# Patient Record
Sex: FEMALE | Race: WHITE | NOT HISPANIC OR LATINO | Employment: FULL TIME | URBAN - METROPOLITAN AREA
[De-identification: names, ages, dates, MRNs, and addresses within clinical notes are randomized per-mention and may not be internally consistent; named-entity substitution may affect disease eponyms.]

---

## 2018-03-08 ENCOUNTER — HISTORICAL (OUTPATIENT)
Dept: RADIOLOGY | Facility: HOSPITAL | Age: 58
End: 2018-03-08

## 2018-08-06 ENCOUNTER — HISTORICAL (OUTPATIENT)
Dept: ADMINISTRATIVE | Facility: HOSPITAL | Age: 58
End: 2018-08-06

## 2018-08-06 LAB
ABS NEUT (OLG): 7.3
ALBUMIN SERPL-MCNC: 4.6 GM/DL (ref 3.4–5)
ALBUMIN/GLOB SERPL: 2 {RATIO} (ref 1.5–2.5)
ALP SERPL-CCNC: 114 UNIT/L (ref 38–126)
ALT SERPL-CCNC: 21 UNIT/L (ref 7–52)
APPEARANCE, UA: CLEAR
AST SERPL-CCNC: 21 UNIT/L (ref 15–37)
BACTERIA #/AREA URNS AUTO: ABNORMAL /HPF
BILIRUB SERPL-MCNC: 0.4 MG/DL (ref 0.2–1)
BILIRUB UR QL STRIP: NEGATIVE MG/DL
BILIRUBIN DIRECT+TOT PNL SERPL-MCNC: 0.1 MG/DL (ref 0–0.5)
BILIRUBIN DIRECT+TOT PNL SERPL-MCNC: 0.3 MG/DL
BUN SERPL-MCNC: 17 MG/DL (ref 7–18)
CALCIUM SERPL-MCNC: 9.1 MG/DL (ref 8.5–10)
CHLORIDE SERPL-SCNC: 109 MMOL/L (ref 98–107)
CHOLEST SERPL-MCNC: 193 MG/DL (ref 0–200)
CHOLEST/HDLC SERPL: 4.3 {RATIO}
CO2 SERPL-SCNC: 24 MMOL/L (ref 21–32)
COLOR UR: YELLOW
CREAT SERPL-MCNC: 0.85 MG/DL (ref 0.6–1.3)
ERYTHROCYTE [DISTWIDTH] IN BLOOD BY AUTOMATED COUNT: 13.8 % (ref 11.5–17)
EST. AVERAGE GLUCOSE BLD GHB EST-MCNC: 111 MG/DL
GLOBULIN SER-MCNC: 2.3 GM/DL (ref 1.2–3)
GLUCOSE (UA): NEGATIVE MG/DL
GLUCOSE SERPL-MCNC: 116 MG/DL (ref 74–106)
HBA1C MFR BLD: 5.5 % (ref 4.4–6.4)
HCT VFR BLD AUTO: 45.2 % (ref 37–47)
HDLC SERPL-MCNC: 45 MG/DL (ref 35–60)
HGB BLD-MCNC: 15.1 GM/DL (ref 12–16)
HGB UR QL STRIP: NEGATIVE UNIT/L
KETONES UR QL STRIP: NEGATIVE MG/DL
LDLC SERPL CALC-MCNC: 114 MG/DL (ref 0–129)
LEUKOCYTE ESTERASE UR QL STRIP: NEGATIVE UNIT/L
LYMPHOCYTES # BLD AUTO: 1.1 X10(3)/MCL (ref 0.6–3.4)
LYMPHOCYTES NFR BLD AUTO: 12.3 % (ref 13–40)
MCH RBC QN AUTO: 30.6 PG (ref 27–31.2)
MCHC RBC AUTO-ENTMCNC: 33 GM/DL (ref 32–36)
MCV RBC AUTO: 92 FL (ref 80–94)
MONOCYTES # BLD AUTO: 0.6 X10(3)/MCL (ref 0–1.8)
MONOCYTES NFR BLD AUTO: 7.2 % (ref 0.1–24)
NEUTROPHILS NFR BLD AUTO: 80.5 % (ref 47–80)
NITRITE UR QL STRIP.AUTO: NEGATIVE
PH UR STRIP: 5 [PH]
PLATELET # BLD AUTO: 220 X10(3)/MCL (ref 130–400)
PMV BLD AUTO: 10.3 FL
POTASSIUM SERPL-SCNC: 4.9 MMOL/L (ref 3.5–5.1)
PROT SERPL-MCNC: 6.9 GM/DL (ref 6.4–8.2)
PROT UR QL STRIP: NEGATIVE MG/DL
RBC # BLD AUTO: 4.93 X10(6)/MCL (ref 4.2–5.4)
RBC #/AREA URNS HPF: ABNORMAL /HPF
SODIUM SERPL-SCNC: 144 MMOL/L (ref 136–145)
SP GR UR STRIP: >1.03
SQUAMOUS EPITHELIAL, UA: ABNORMAL /LPF
TRIGL SERPL-MCNC: 182 MG/DL (ref 30–150)
TSH SERPL-ACNC: 1 MIU/ML (ref 0.35–4.94)
UROBILINOGEN UR STRIP-ACNC: 0.2 MG/DL
VLDLC SERPL CALC-MCNC: 36.4 MG/DL
WBC # SPEC AUTO: 9 X10(3)/MCL (ref 4.5–11.5)
WBC #/AREA URNS AUTO: ABNORMAL /[HPF]

## 2018-08-27 ENCOUNTER — HISTORICAL (OUTPATIENT)
Dept: ADMINISTRATIVE | Facility: HOSPITAL | Age: 58
End: 2018-08-27

## 2019-06-12 ENCOUNTER — HISTORICAL (OUTPATIENT)
Dept: ADMINISTRATIVE | Facility: HOSPITAL | Age: 59
End: 2019-06-12

## 2019-06-17 ENCOUNTER — HISTORICAL (OUTPATIENT)
Dept: ADMINISTRATIVE | Facility: HOSPITAL | Age: 59
End: 2019-06-17

## 2019-07-10 ENCOUNTER — HISTORICAL (OUTPATIENT)
Dept: ADMINISTRATIVE | Facility: HOSPITAL | Age: 59
End: 2019-07-10

## 2019-08-21 ENCOUNTER — HISTORICAL (OUTPATIENT)
Dept: ADMINISTRATIVE | Facility: HOSPITAL | Age: 59
End: 2019-08-21

## 2019-08-29 ENCOUNTER — HISTORICAL (OUTPATIENT)
Dept: ADMINISTRATIVE | Facility: HOSPITAL | Age: 59
End: 2019-08-29

## 2019-08-29 LAB
APPEARANCE, UA: CLEAR
BACTERIA #/AREA URNS AUTO: NORMAL /HPF
BILIRUB UR QL STRIP: NEGATIVE MG/DL
CHOLEST SERPL-MCNC: 152 MG/DL (ref 0–200)
CHOLEST/HDLC SERPL: 4.1 {RATIO}
COLOR UR: YELLOW
GLUCOSE (UA): NEGATIVE MG/DL
HDLC SERPL-MCNC: 37 MG/DL (ref 35–60)
HGB UR QL STRIP: NEGATIVE UNIT/L
KETONES UR QL STRIP: NEGATIVE MG/DL
LDLC SERPL CALC-MCNC: 86 MG/DL (ref 0–129)
LEUKOCYTE ESTERASE UR QL STRIP: NEGATIVE UNIT/L
NITRITE UR QL STRIP.AUTO: NEGATIVE
PH UR STRIP: 5.5 [PH]
PROT UR QL STRIP: NEGATIVE MG/DL
RBC #/AREA URNS HPF: NORMAL /HPF
SP GR UR STRIP: 1.02
SQUAMOUS EPITHELIAL, UA: NORMAL /LPF
TRIGL SERPL-MCNC: 111 MG/DL (ref 30–150)
UROBILINOGEN UR STRIP-ACNC: 0.2 MG/DL
VLDLC SERPL CALC-MCNC: 22.2 MG/DL
WBC #/AREA URNS AUTO: NORMAL /[HPF]

## 2019-11-20 ENCOUNTER — HISTORICAL (OUTPATIENT)
Dept: ADMINISTRATIVE | Facility: HOSPITAL | Age: 59
End: 2019-11-20

## 2019-12-05 ENCOUNTER — HISTORICAL (OUTPATIENT)
Dept: ADMINISTRATIVE | Facility: HOSPITAL | Age: 59
End: 2019-12-05

## 2019-12-05 LAB
APPEARANCE, UA: CLEAR
BACTERIA #/AREA URNS AUTO: ABNORMAL /HPF
BILIRUB UR QL STRIP: NEGATIVE MG/DL
COLOR UR: YELLOW
GLUCOSE (UA): NEGATIVE MG/DL
HGB UR QL STRIP: NEGATIVE UNIT/L
KETONES UR QL STRIP: NEGATIVE MG/DL
LEUKOCYTE ESTERASE UR QL STRIP: NEGATIVE UNIT/L
NITRITE UR QL STRIP.AUTO: NEGATIVE
PH UR STRIP: 5.5 [PH]
PROT UR QL STRIP: NEGATIVE MG/DL
RBC #/AREA URNS HPF: ABNORMAL /HPF
SP GR UR STRIP: >1.03
SQUAMOUS EPITHELIAL, UA: ABNORMAL /LPF
UROBILINOGEN UR STRIP-ACNC: 0.2 MG/DL
WBC #/AREA URNS AUTO: ABNORMAL /[HPF]

## 2020-02-04 ENCOUNTER — HISTORICAL (OUTPATIENT)
Dept: ADMINISTRATIVE | Facility: HOSPITAL | Age: 60
End: 2020-02-04

## 2020-05-13 ENCOUNTER — HISTORICAL (OUTPATIENT)
Dept: ADMINISTRATIVE | Facility: HOSPITAL | Age: 60
End: 2020-05-13

## 2020-06-11 ENCOUNTER — HISTORICAL (OUTPATIENT)
Dept: ADMINISTRATIVE | Facility: HOSPITAL | Age: 60
End: 2020-06-11

## 2020-06-11 LAB
APPEARANCE, UA: CLEAR
BACTERIA #/AREA URNS AUTO: ABNORMAL /HPF
BILIRUB UR QL STRIP: NEGATIVE MG/DL
COLOR UR: YELLOW
GLUCOSE (UA): NEGATIVE MG/DL
HGB UR QL STRIP: NEGATIVE UNIT/L
KETONES UR QL STRIP: ABNORMAL MG/DL
LEUKOCYTE ESTERASE UR QL STRIP: NEGATIVE UNIT/L
NITRITE UR QL STRIP.AUTO: NEGATIVE
PH UR STRIP: 6 [PH]
PROT UR QL STRIP: ABNORMAL MG/DL
RBC #/AREA URNS HPF: ABNORMAL /HPF
SP GR UR STRIP: >1.03
SQUAMOUS EPITHELIAL, UA: ABNORMAL /LPF
UROBILINOGEN UR STRIP-ACNC: 0.2 MG/DL
WBC #/AREA URNS AUTO: ABNORMAL /[HPF]

## 2020-07-08 LAB
BUN SERPL-MCNC: 11.7 MG/DL (ref 9.8–20.1)
CALCIUM SERPL-MCNC: 11 MG/DL (ref 8.4–10.2)
CHLORIDE SERPL-SCNC: 107 MMOL/L (ref 98–107)
CO2 SERPL-SCNC: 28 MMOL/L (ref 22–29)
CREAT SERPL-MCNC: 1.03 MG/DL (ref 0.57–1.11)
CREAT/UREA NIT SERPL: 11
GLUCOSE SERPL-MCNC: 117 MG/DL (ref 74–100)
POTASSIUM SERPL-SCNC: 4.2 MMOL/L (ref 3.5–5.1)
SODIUM SERPL-SCNC: 147 MMOL/L (ref 136–145)

## 2020-07-16 ENCOUNTER — HISTORICAL (OUTPATIENT)
Dept: SURGERY | Facility: HOSPITAL | Age: 60
End: 2020-07-16

## 2020-08-19 ENCOUNTER — HISTORICAL (OUTPATIENT)
Dept: ADMINISTRATIVE | Facility: HOSPITAL | Age: 60
End: 2020-08-19

## 2020-08-19 LAB
ABS NEUT (OLG): 6.8 X10(3)/MCL (ref 2.1–9.2)
ALBUMIN SERPL-MCNC: 4.8 GM/DL (ref 3.4–5)
ALBUMIN/GLOB SERPL: 2.18 {RATIO} (ref 1.5–2.5)
ALP SERPL-CCNC: 111 UNIT/L (ref 38–126)
ALT SERPL-CCNC: 16 UNIT/L (ref 7–52)
APPEARANCE, UA: CLEAR
AST SERPL-CCNC: 16 UNIT/L (ref 15–37)
BACTERIA #/AREA URNS AUTO: ABNORMAL /HPF
BILIRUB SERPL-MCNC: 0.4 MG/DL (ref 0.2–1)
BILIRUB UR QL STRIP: NEGATIVE MG/DL
BILIRUBIN DIRECT+TOT PNL SERPL-MCNC: 0.1 MG/DL (ref 0–0.5)
BILIRUBIN DIRECT+TOT PNL SERPL-MCNC: 0.3 MG/DL
BUN SERPL-MCNC: 16 MG/DL (ref 7–18)
CALCIUM SERPL-MCNC: 10.1 MG/DL (ref 8.5–10)
CHLORIDE SERPL-SCNC: 105 MMOL/L (ref 98–107)
CHOLEST SERPL-MCNC: 221 MG/DL (ref 0–200)
CHOLEST/HDLC SERPL: 4.6 {RATIO}
CO2 SERPL-SCNC: 27 MMOL/L (ref 21–32)
COLOR UR: YELLOW
CREAT SERPL-MCNC: 0.78 MG/DL (ref 0.6–1.3)
ERYTHROCYTE [DISTWIDTH] IN BLOOD BY AUTOMATED COUNT: 13.7 % (ref 11.5–17)
EST. AVERAGE GLUCOSE BLD GHB EST-MCNC: 103 MG/DL
GLOBULIN SER-MCNC: 2.2 GM/DL (ref 1.2–3)
GLUCOSE (UA): NEGATIVE MG/DL
GLUCOSE SERPL-MCNC: 109 MG/DL (ref 74–106)
HBA1C MFR BLD: 5.2 % (ref 4.4–6.4)
HCT VFR BLD AUTO: 45.4 % (ref 37–47)
HDLC SERPL-MCNC: 48 MG/DL (ref 35–60)
HGB BLD-MCNC: 15.1 GM/DL (ref 12–16)
HGB UR QL STRIP: NEGATIVE UNIT/L
KETONES UR QL STRIP: NEGATIVE MG/DL
LDLC SERPL CALC-MCNC: 146 MG/DL (ref 0–129)
LEUKOCYTE ESTERASE UR QL STRIP: NEGATIVE UNIT/L
LYMPHOCYTES # BLD AUTO: 1.2 X10(3)/MCL (ref 0.6–3.4)
LYMPHOCYTES NFR BLD AUTO: 13.9 % (ref 13–40)
MCH RBC QN AUTO: 30.6 PG (ref 27–31.2)
MCHC RBC AUTO-ENTMCNC: 33 GM/DL (ref 32–36)
MCV RBC AUTO: 92 FL (ref 80–94)
MONOCYTES # BLD AUTO: 0.7 X10(3)/MCL (ref 0.1–1.3)
MONOCYTES NFR BLD AUTO: 7.7 % (ref 0.1–24)
NEUTROPHILS NFR BLD AUTO: 78.4 % (ref 47–80)
NITRITE UR QL STRIP.AUTO: NEGATIVE
PH UR STRIP: 5.5 [PH]
PLATELET # BLD AUTO: 252 X10(3)/MCL (ref 130–400)
PMV BLD AUTO: 9.5 FL (ref 9.4–12.4)
POTASSIUM SERPL-SCNC: 4.6 MMOL/L (ref 3.5–5.1)
PROT SERPL-MCNC: 7 GM/DL (ref 6.4–8.2)
PROT UR QL STRIP: NEGATIVE MG/DL
RBC # BLD AUTO: 4.94 X10(6)/MCL (ref 4.2–5.4)
RBC #/AREA URNS HPF: ABNORMAL /HPF
SODIUM SERPL-SCNC: 141 MMOL/L (ref 136–145)
SP GR UR STRIP: >1.03
SQUAMOUS EPITHELIAL, UA: ABNORMAL /LPF
TRIGL SERPL-MCNC: 205 MG/DL (ref 30–150)
TSH SERPL-ACNC: 0.61 MIU/ML (ref 0.35–4.94)
UROBILINOGEN UR STRIP-ACNC: 1 MG/DL
VLDLC SERPL CALC-MCNC: 41 MG/DL
WBC # SPEC AUTO: 8.7 X10(3)/MCL (ref 4.5–11.5)
WBC #/AREA URNS AUTO: ABNORMAL /[HPF]

## 2021-07-24 ENCOUNTER — HISTORICAL (OUTPATIENT)
Dept: ADMINISTRATIVE | Facility: HOSPITAL | Age: 61
End: 2021-07-24

## 2021-07-24 LAB — SARS-COV-2 RNA RESP QL NAA+PROBE: POSITIVE

## 2021-07-30 ENCOUNTER — HISTORICAL (OUTPATIENT)
Dept: INFECTIOUS DISEASES | Facility: HOSPITAL | Age: 61
End: 2021-07-30

## 2021-08-24 ENCOUNTER — HISTORICAL (OUTPATIENT)
Dept: ADMINISTRATIVE | Facility: HOSPITAL | Age: 61
End: 2021-08-24

## 2021-08-24 LAB
ABS NEUT (OLG): 5.6 X10(3)/MCL (ref 2.1–9.2)
ALBUMIN SERPL-MCNC: 4.5 GM/DL (ref 3.4–5)
ALBUMIN/GLOB SERPL: 1.55 {RATIO} (ref 1.5–2.5)
ALP SERPL-CCNC: 103 UNIT/L (ref 38–126)
ALT SERPL-CCNC: 22 UNIT/L (ref 7–52)
APPEARANCE, UA: CLEAR
AST SERPL-CCNC: 23 UNIT/L (ref 15–37)
BACTERIA #/AREA URNS AUTO: ABNORMAL /HPF
BILIRUB SERPL-MCNC: 0.5 MG/DL (ref 0.2–1)
BILIRUB UR QL STRIP: NEGATIVE MG/DL
BILIRUBIN DIRECT+TOT PNL SERPL-MCNC: 0 MG/DL (ref 0–0.5)
BILIRUBIN DIRECT+TOT PNL SERPL-MCNC: 0.5 MG/DL
BUN SERPL-MCNC: 16 MG/DL (ref 7–18)
CALCIUM SERPL-MCNC: 9.9 MG/DL (ref 8.5–10)
CHLORIDE SERPL-SCNC: 106 MMOL/L (ref 98–107)
CHOLEST SERPL-MCNC: 145 MG/DL (ref 0–200)
CHOLEST/HDLC SERPL: 4 {RATIO}
CO2 SERPL-SCNC: 25 MMOL/L (ref 21–32)
COLOR UR: YELLOW
CREAT SERPL-MCNC: 0.79 MG/DL (ref 0.6–1.3)
ERYTHROCYTE [DISTWIDTH] IN BLOOD BY AUTOMATED COUNT: 14.8 % (ref 11.5–17)
EST. AVERAGE GLUCOSE BLD GHB EST-MCNC: 114 MG/DL
GLOBULIN SER-MCNC: 2.9 GM/DL (ref 1.2–3)
GLUCOSE (UA): NEGATIVE MG/DL
GLUCOSE SERPL-MCNC: 85 MG/DL (ref 74–106)
HBA1C MFR BLD: 5.6 % (ref 4.4–6.4)
HCT VFR BLD AUTO: 42 % (ref 37–47)
HDLC SERPL-MCNC: 36 MG/DL (ref 35–60)
HGB BLD-MCNC: 13.7 GM/DL (ref 12–16)
HGB UR QL STRIP: NEGATIVE UNIT/L
KETONES UR QL STRIP: ABNORMAL MG/DL
LDLC SERPL CALC-MCNC: 73 MG/DL (ref 0–129)
LEUKOCYTE ESTERASE UR QL STRIP: NEGATIVE UNIT/L
LYMPHOCYTES # BLD AUTO: 1.7 X10(3)/MCL (ref 0.6–3.4)
LYMPHOCYTES NFR BLD AUTO: 21.2 % (ref 13–40)
MCH RBC QN AUTO: 29.3 PG (ref 27–31.2)
MCHC RBC AUTO-ENTMCNC: 33 GM/DL (ref 32–36)
MCV RBC AUTO: 90 FL (ref 80–94)
MONOCYTES # BLD AUTO: 0.5 X10(3)/MCL (ref 0.1–1.3)
MONOCYTES NFR BLD AUTO: 7 % (ref 0.1–24)
NEUTROPHILS NFR BLD AUTO: 71.8 % (ref 47–80)
NITRITE UR QL STRIP.AUTO: NEGATIVE
PH UR STRIP: 5.5 [PH]
PLATELET # BLD AUTO: 329 X10(3)/MCL (ref 130–400)
PMV BLD AUTO: 9.3 FL (ref 9.4–12.4)
POTASSIUM SERPL-SCNC: 4 MMOL/L (ref 3.5–5.1)
PROT SERPL-MCNC: 7.4 GM/DL (ref 6.4–8.2)
PROT UR QL STRIP: NEGATIVE MG/DL
RBC # BLD AUTO: 4.68 X10(6)/MCL (ref 4.2–5.4)
RBC #/AREA URNS HPF: ABNORMAL /HPF
SODIUM SERPL-SCNC: 141 MMOL/L (ref 136–145)
SP GR UR STRIP: >1.03
SQUAMOUS EPITHELIAL, UA: ABNORMAL /LPF
TRIGL SERPL-MCNC: 144 MG/DL (ref 30–150)
UROBILINOGEN UR STRIP-ACNC: 0.2 MG/DL
VLDLC SERPL CALC-MCNC: 28.8 MG/DL
WBC # SPEC AUTO: 7.8 X10(3)/MCL (ref 4.5–11.5)
WBC #/AREA URNS AUTO: ABNORMAL /[HPF]

## 2022-01-14 ENCOUNTER — HISTORICAL (OUTPATIENT)
Dept: ADMINISTRATIVE | Facility: HOSPITAL | Age: 62
End: 2022-01-14

## 2022-01-14 LAB — SARS-COV-2 RNA RESP QL NAA+PROBE: NEGATIVE

## 2022-01-17 LAB
INFLUENZA A ANTIGEN, POC: NEGATIVE
INFLUENZA B ANTIGEN, POC: NEGATIVE
SARS-COV-2 RNA RESP QL NAA+PROBE: NEGATIVE

## 2022-04-10 ENCOUNTER — HISTORICAL (OUTPATIENT)
Dept: ADMINISTRATIVE | Facility: HOSPITAL | Age: 62
End: 2022-04-10
Payer: COMMERCIAL

## 2022-04-20 LAB — HPV APTIMA: NEGATIVE

## 2022-04-21 LAB
PAP RECOMMENDATION EXT: NORMAL
PAP SMEAR: NORMAL

## 2022-04-25 VITALS
DIASTOLIC BLOOD PRESSURE: 92 MMHG | WEIGHT: 174.19 LBS | SYSTOLIC BLOOD PRESSURE: 140 MMHG | BODY MASS INDEX: 29.02 KG/M2 | HEIGHT: 65 IN | OXYGEN SATURATION: 98 %

## 2022-04-30 NOTE — OP NOTE
Patient:   Shawna Nguyen            MRN: [id number]            FIN: [id number]               Age:   60 years     Sex:  Female     :  1960   Associated Diagnoses:   None   Author:   Gary ELLIS MD, Adeel CHANEY      SURGEON: Adeel Vega MD    PREOPERATIVE DIAGNOSIS: Right shoulder impingement, right shoulder adhesive capsulitis, retained hardware right proximal humerus  POSTOPERATIVE DIAGNOSIS: Right shoulder impingement, right shoulder adhesive capsulitis, retained hardware right proximal humerus    PROCEDURE PERFORMED:   1. Right shoulder arthroscopic lysis of adhesions, manipulation under anesthesia  2. Right shoulder arthroscopic subacromial decompression  3. Right shoulder open deep hardware removal    ASSISTANT: JOANNE Rodriguez    ANESTHESIA: General plus regional    ESTIMATED BLOOD LOSS: Minimal.    COMPLICATIONS: None.    EXPLANTS:    1.  Biomet proximal humerus plate and screws    INDICATIONS FOR PROCEDURE: Shawna is a 60y.o. female who has had ongoing right shoulder pain and limited motion status post open reduction internal fixation of a right proximal humerus fracture approximately 1 year ago. The patient has failed nonoperative treatment and has elected for operative intervention. Risks and benefits were thoroughly explained and consent was obtained prior to today's procedure.    DESCRIPTION OF PROCEDURE: The patient was seen in the preoperative holding area where the history and physical were reviewed without change. The operative shoulder was marked, consents were reviewed, and any questions were answered for the patient. A regional blockade of the shoulder was performed by the Anesthesia Service. The patient was induced under general anesthesia. Next the patient was placed upright into the beachchair position with care taken to ensure the cervical spine was well positioned and the face, eyes and all bony prominences well protected. Preoperative time-out led by the surgeon was performed  verifying the patient, procedure, and preoperative antibiotics. The right upper extremity was then prepped and draped in the usual sterile fashion and secured to an arm enriquez.    A standard posterior portal was established with a #11-blade.  The arthroscope was inserted into the glenohumeral joint atraumatically. The joint was insufflated with arthroscopic fluid. We then made a standard anterior portal using an #18-gauge spinal needle for guidance. An arthroscopic probe was inserted through the anterior portal and diagnostic arthroscopy begun.    This revealed a previous tenotomized biceps tendon with a remaining stump.  The stump was debrided with a shaver. A frayed superior labrum which was also debrided with a shaver. An intact anterior, inferior and posterior labrum. The articular cartilage of the humeral head was intact. The articular cartilage of the glenoid was intact. The subscapularis tendon was intact but encased in scar.  This was debrided with accommodation of a shaver and RF device.  [facility] was released.. The articular side of the supraspinatus tendon was intact. The articular side of the infraspinatus tendon was intact.    I then moved into the subacromial space.  We identified the undersurface of the acromion. We used a VAPR to debride the undersurface of the acromion up to the anterior and lateral margins. We identified the CA ligament and reflected it off the acromion. We continued debridement of the bursal tissue, which was extensive.  I used a shaver to the breed this.  I could actually visualize the anterior plate.    Next, we did our acromioplasty by burring the anterolateral margin of the acromion then working carefully medially. The portals were switched and the acromioplasty was complated through the posterior portal in the cutting block fashion.     Once we completed this, we took the remaining final arthroscopic pictures. We then removed our instruments, closed the portals with #3-0  monocryl suture.     I then turned my attention to the anterior shoulder.  I made the previous incision through the deltopectoral interval.  Sharp incised the skin and down through subcutaneous tissue.  Identified the cephalic vein and retracted laterally.  I came down onto the anterior plate and removed it in its entirety.  It was 1 plate and 4 screws.  I then turned my attention laterally and again removed the plate and screws on the lateral proximal humerus.  I then used a rongeur and a curette in order to remove some scar tissue which had developed.  There was a couple of retained permanent sutures which were also removed.  Happy this I irrigated out the wound and close incision.    Sterile dressings were applied. The patient was then placed in an abduction pillow and sling, awoken from general anesthetic, and taken to recovery room in a stable condition.

## 2022-05-03 NOTE — HISTORICAL [facility]
This is a historical note converted from [employer]. Formatting and pictures may have been removed.  Please reference [employer] for original formatting and attached multimedia. Chief Complaint  Hematuria  History of Present Illness  When she urinated last night, it was light pink. Her urine got lighter as the night progressed. Her urine is clear today.  She noticed minimal itching around her urethra 1 day ago; it has resolved. She denies dysuria or problems voiding. She may be going slightly more often but she is drinking more. No h/o kidney disease since she had glomerulonephritis. She did eat some beets yesterday evening. She is not on any blood thinners. She does not have any bleeding disorders. No history of kidney stones. No nausea/vomiting. No fever/chills.  Review of Systems  She is still having low back discomfort.  She is having surgery on 7/16/2020 to remove screws from her right shoulder.  Physical Exam  Vitals & Measurements  T:?36.7? ?C (Oral)? HR:?84(Peripheral)? BP:?100/60?  HT:?168?cm? WT:?79.9?kg? BMI:?28.31?  General: She is well-developed well-nourished white female no apparent distress.  She does not have any costovertebral angle tenderness or suprapubic tenderness. ?Abdomen?is soft and benign.  Bilateral lower extremities are without edema.  Assessment/Plan  1.?Hematuria?R31.9  Patient has a normal urinalysis.? She has no signs or symptoms?of?renal disease, kidney stones?or urinary tract infection.? Her previous urinalysis did not reveal any?hematuria.  Patient advised to let me know if she?takes any?more blood in her urine. ?I told her no further work-up is indicated at this time.  Patient is agreeable to plan and will let me know.  Ordered:  Office/Outpatient Visit Level 3 Established [id number] PC, Hematuria, [facility], 06/11/20 14:17:00 CDT  ?  Orders:  Clinic Follow-up PRN, 06/11/20 14:18:00 CDT, [facility], Future Order  Referrals  Clinic Follow-up PRN, 06/11/20 14:18:00 CDT, [facility]  [facility], Future Order   Problem List/Past Medical History  Ongoing  Anxiety  Constipation  Diverticulitis  Environmental allergies  Fracture of base of metacarpal of thumb  Fracture of great toe  Hemorrhoid  High blood cholesterol level  HTN (hypertension)  Incomplete defecation  Inflammation of right sacroiliac joint  Insomnia  Obesity  Polyp colon  Proximal humerus fracture  Right ankle sprain  Sleep apnea  Upper respiratory infection  Historical  No qualifying data  Procedure/Surgical History  ORIF Humerus Proximal (Right) (05/09/2019)  Pinning Percutaneous Finger (Left) (05/09/2019)  Reposition Left Metacarpal with Internal Fixation Device, Percutaneous Approach (05/09/2019)  Reposition Right Humeral Head with Internal Fixation Device, Open Approach (05/09/2019)  Reposition Right Toe Phalanx with Internal Fixation Device, Percutaneous Approach (05/09/2019)  Application of short arm splint (forearm to hand); static (05/05/2019)  Immobilization of Left Lower Arm using Splint (05/05/2019)  Cervical spinal fusion  Hemorrhoidectomy   Medications  alPRAzolam 0.5 mg oral tablet, 0.5 mg= 1 tab(s), Oral, TID, PRN  atorvastatin 40 mg oral tablet, See Instructions, 11 refills  lisinopril 10 mg oral tablet, See Instructions, 11 refills  polyethylene glycol 3350 ( MiraLax ), 17 gm, Oral, Daily  Allergies  codeine?(Itching, Hives)  Social History  Abuse/Neglect  No, 06/11/2020  Alcohol  Current, Beer, 1-2 times per year, 05/07/2019  Current, 1-2 times per week, 08/06/2018  Employment/School  Employed, Work/School description: OPERATIONS ., 08/06/2018  Exercise  Home/Environment  Lives with Alone., 05/07/2019  Living situation: Home/Independent. Home equipment: CPAP/BiPAP., 08/06/2018  Nutrition/Health  Regular, Diet restrictions: no seeds/nuts., 05/07/2019  Regular, Caffeine intake amount: 1-2 CUPS OF COFFEE PER DAY., 08/06/2018  Substance Use  Never, 08/06/2018  Tobacco - Denies Tobacco Use,  12/20/2013  Never (less than 100 in lifetime), N/A, 06/11/2020  Family History  Hyperlipidemia.: Brother.  Hypertension.: Sister and Brother.  Primary malignant neoplasm of brain: Brother.  Primary malignant neoplasm of lung: Brother.  Immunizations  Vaccine Date Status   tetanus/diphtheria/pertussis, acel(Tdap) 08/06/2018 Given   pneumococcal 13-valent conjugate vaccine 12/14/2016 Recorded   tetanus-diphtheria toxoids 06/22/2015 Given   tetanus/diphtheria/pertussis, acel(Tdap) 11/01/2007 Recorded   Health Maintenance  Health Maintenance  ???Pending?(in the next year)  ??? ??OverDue  ??? ? ? ?Diabetes Screening due??and every?  ??? ? ? ?Hypertension Management-BMP due??05/10/20??and every 1??year(s)  ??? ??Due?  ??? ? ? ?Aspirin Therapy for CVD Prevention due??06/11/20??and every 1??year(s)  ??? ? ? ?Depression Screening due??06/11/20??and every?  ??? ??Due In Future?  ??? ? ? ?Alcohol Misuse Screening not due until??01/01/21??and every 1??year(s)  ??? ? ? ?Obesity Screening not due until??01/01/21??and every 1??year(s)  ??? ? ? ?Cervical Cancer Screening not due until??02/21/21??and every 3??year(s)  ??? ? ? ?Breast Cancer Screening not due until??03/05/21??and every 2??year(s)  ??? ? ? ?ADL Screening not due until??05/13/21??and every 1??year(s)  ???Satisfied?(in the past 1 year)  ??? ??Satisfied?  ??? ? ? ?ADL Screening on??05/13/20.??Satisfied by Shantell Sloan  ??? ? ? ?Alcohol Misuse Screening on??06/08/20.??Satisfied by Amber Hollis  ??? ? ? ?Blood Pressure Screening on??06/11/20.??Satisfied by Sonya Srinivasan LPN  ??? ? ? ?Body Mass Index Check on??06/11/20.??Satisfied by Sonya Srinivasan LPN  ??? ? ? ?Hypertension Management-Blood Pressure on??06/11/20.??Satisfied by Sonya Srinivasan LPN  ??? ? ? ?Lipid Screening on??08/29/19.??Satisfied by Zander Engle  ??? ? ? ?Obesity Screening on??06/11/20.??Satisfied by Sonya Srinivasan LPN  ?  Lab Results  Test Name Test Result Date/Time   UA Appear Clear  06/11/2020 13:51 CDT   UA Color Yellow 06/11/2020 13:51 CDT   UA Spec Grav >1.030 (Abnormal) 06/11/2020 13:51 CDT   UA Bili Negative UA 06/11/2020 13:51 CDT   UA pH 6.0 06/11/2020 13:51 CDT   UA Urobilinogen 0.2 06/11/2020 13:51 CDT   UA Blood Negative UA 06/11/2020 13:51 CDT   UA Glucose Negative UA 06/11/2020 13:51 CDT   UA Ketones 1+ (Abnormal) 06/11/2020 13:51 CDT   UA Protein Trace UA 06/11/2020 13:51 CDT   UA Nitri Negative 06/11/2020 13:51 CDT   UA Leuk Est Negative UA 06/11/2020 13:51 CDT   UA WBC cnt 0-2 06/11/2020 13:51 CDT   UA RBC 0-3 06/11/2020 13:51 CDT   UA Bact Rare 06/11/2020 13:51 CDT   UA Squam Epithelial Few 06/11/2020 13:51 CDT

## 2022-05-03 NOTE — HISTORICAL [facility] CERNER
This is a historical note converted from Vonnie. Formatting and pictures may have been removed.  Please reference Vonnie for original formatting and attached multimedia. Chief Complaint  Annual wellness physical- NPO  History of Present Illness  Pt presents for Wellness exam.  Pt had COVID infection/antibody infusion in July. She was out for 21 days. She returned to work on 8/17/2021.  She is now working at [employer]; .  She does exercise bands.  She is not sleeping well; she has problems falling and staying asleep. She has been off of C-PAP for a few years.  Appetite varies. She eats an average of twice a day.  No tobacco.  Social alcohol; quite infrequent.  + coffee:?+ cup/day.  Colonoscopy 2017: Dr Jose Deluca; multiple polyps, mild diverticulosis.  GYN: Dr Cheryl Montanez, earlier this year. ?Mammogram: 2021.  Dermatologist: [facility]; Dr Papa Delatorre.  Review of Systems  Constitutional:?no?weight gain,?no?weight loss,?+?fatigue, ?no?fever, ?no?chills, ?no?weakness, ?+?trouble sleeping,?+?JORDAN, ?+ hot flashes  Eyes: ?no?vision loss/changes,?+?glasses?and contacts,??no?pain,??no?redness,??no?blurry or double vision,??no?flashing lights,??no?glaucoma,??no?cataracts  Last eye exam:? 2020  Neck: ?no?lymphadenopathy,??no?thyroid abnormalities,??no?bruits,??no?stiffness  Ears:?no?decreased hearing,??no?tinnitus,??no?earache,??no?drainage?  Nose:?+?congestion,??+?rhinorrhea,??no?epistaxis,??no?sinus pressure,? + allergies: seasonal, weather changes  Throat/Oral:?no?sore throat,??no?hoarseness, ?no?dental caries,?no?gum bleeding,??no?oral lesions  Cardiovascular:?no?chest pain, palpitations, or tightness,?no?dyspnea with exertion,??no?orthopnea,??no?paroxysmal nocturnal dyspnea, + hypertension, + hypercholesteremia  Respiratory:?no?cough,?no?sputum,??no?hemoptysis,??no?dyspnea,??no?wheezing,??no?pleuritic chest pain?  Gastrointestinal:?no?abdominal pain,?no?nausea,?no?vomiting,??+?heartburn:  dietary related, occasionally takes a?NABOR, ?no?dysphagia or odynophagia,??no?diarrhea,??+?constipation, she has been on a stool softener twice a day and?Metamucil,??no?melena,??no?hematochezia,?no?jaundice  Urinary:? no?frequency,??no?urgency,??no?burning or pain,?+?hematuria,?h/o, +?incontinence, stress??no?hesitancy,??no?incomplete voiding,??no?flank pain,??+?nocturia: 2-3 x  Musculoskeletal:?no?myalgias,?no?arthralgias,?no?neck pain,??+?back pain, regularly, occasional has sciatica, no?swelling of extremities, her right toe has been bothering her for a few months, her nail is not growing back correctly  Skin:?no?rashes,?no?sores,?no?non-healing wounds  Neurologic:?no?headaches,?no?dizziness/lightheadedness,?no?tremors,?no?paresthesias,??no?seizures,??no?muscle weakness  Psychiatric:?no?depression/sadness,?no?anhedonia,?no?irritability,?no?suicidal ideations,?+?anxiety,?no?panic attacks  Endocrine:?no?hot or cold intolerance,?no?sweating,?no?polyuria,?no?polydipsia,?no?polyphagia, + hyperglycemia  Hematologic:?no?bruising,??no?bleeding disorders?  Physical Exam  Vitals & Measurements  HR:?93(Peripheral)? BP:?120/60? SpO2:?97%?  HT:?165.00?cm? WT:?76.700?kg? BMI:?28.17?  PHYSICAL EXAMINATION:  GENERAL: The patient is a well-developed, well-nourished white?female in no?apparent distress. She is alert and oriented x 4.  HEENT: Head is normocephalic and atraumatic. Extraocular muscles are intact. Pupils are equal, round, and reactive to light and accommodation. Nares appeared normal. Mouth is well hydrated and without lesions. Mucous membranes are moist. Posterior pharynx clear of any exudate or lesions.  NECK: Supple. No carotid bruits.? No lymphadenopathy or thyromegaly.  LUNGS: Clear to auscultation.  HEART: Regular rate and rhythm without murmur, gallops or rubs.  ABDOMEN: Soft, nontender, and nondistended.? Positive bowel sounds.? No hepatosplenomegaly was noted.  EXTREMITIES: Without any cyanosis, clubbing,  rash, lesions or edema.  NEUROLOGIC: Cranial nerves II through XII are grossly intact.? No motor or sensory deficits.? Cerebellar function intact.  SKIN: No ulceration or induration present.  ?  ?  Assessment/Plan  1.?Wellness examination?Z00.00  ?Patient presents for wellness examination.  Patient continues to improve from recent?bout with Covid.?She still has some fatigue at times.  She is up-to-date with her colonoscopies.  She is up-to-date with her GYN checkups and mammograms.  Patient encouraged to increase physical activity, exercise and lose weight.  Labs pending.  Ordered:  Clinic Follow-Up Wellness, *Est. 08/24/22 3:00:00 CDT, Order for future visit, Wellness examination, [facility]  Comprehensive Metabolic Panel, Routine collect, 08/24/21 16:48:00 CDT, Blood, Stop date 08/24/21 16:49:00 CDT, Lab Collect, Wellness examination  HTN (hypertension)  Hyperglycemia, 08/24/21 16:48:00 CDT  Lipid Panel, Routine collect, 08/24/21 16:48:00 CDT, Blood, Stop date 08/24/21 16:49:00 CDT, Lab Collect, Wellness examination  Hypercholesterolemia  HTN (hypertension)  Hyperglycemia, 08/24/21 16:48:00 CDT  Preventative Health Care Est 40-64 years [id number] PC, Wellness examination  HTN (hypertension)  Hypercholesterolemia  Hyperglycemia  Hematuria  Environmental allergies  Anxiety  Constipation  Ingrown right big toenail, [facility] - [facility], 08/24/21 16:41:00 CDT  ?  2.?HTN (hypertension)?I10  Well-controlled; continue current medication.?Refills sent.  Ordered:  Clinic Follow up, *Est. 02/24/22 8:45:00 CST, Order for future visit, Constipation, [facility]  Comprehensive Metabolic Panel, Routine collect, 08/24/21 16:48:00 CDT, Blood, Stop date 08/24/21 16:49:00 CDT, Lab Collect, Wellness examination  HTN (hypertension)  Hyperglycemia, 08/24/21 16:48:00 CDT  Lipid Panel, Routine collect, 08/24/21 16:48:00 CDT, Blood, Stop date 08/24/21 16:49:00 CDT, Lab Collect, Wellness examination  Hypercholesterolemia  HTN  (hypertension)  Hyperglycemia, 08/24/21 16:48:00 CDT  Preventative Health Care Est 40-64 years [id number] PC, Wellness examination  HTN (hypertension)  Hypercholesterolemia  Hyperglycemia  Hematuria  Environmental allergies  Anxiety  Constipation  Ingrown right big toenail, [facility] - [facility], 08/24/21 16:41:00 CDT  ?  3.?Hypercholesterolemia?E78.00  ?Patient has been compliant with medication; refills sent.  Lipid profile pending.  Ordered:  Clinic Follow up, *Est. 02/24/22 8:45:00 CST, Order for future visit, Constipation, [facility]  Lipid Panel, Routine collect, 08/24/21 16:48:00 CDT, Blood, Stop date 08/24/21 16:49:00 CDT, Lab Collect, Wellness examination  Hypercholesterolemia  HTN (hypertension)  Hyperglycemia, 08/24/21 16:48:00 CDT  Preventative Health Care Est 40-64 years [id number] PC, Wellness examination  HTN (hypertension)  Hypercholesterolemia  Hyperglycemia  Hematuria  Environmental allergies  Anxiety  Constipation  Ingrown right big toenail, [facility] - [facility], 08/24/21 16:41:00 CDT  ?  4.?Hyperglycemia?R73.9  A1c pending.  Ordered:  Clinic Follow up, *Est. 02/24/22 8:45:00 CST, Order for future visit, Constipation, [facility]  Comprehensive Metabolic Panel, Routine collect, 08/24/21 16:48:00 CDT, Blood, Stop date 08/24/21 16:49:00 CDT, Lab Collect, Wellness examination  HTN (hypertension)  Hyperglycemia, 08/24/21 16:48:00 CDT  Lipid Panel, Routine collect, 08/24/21 16:48:00 CDT, Blood, Stop date 08/24/21 16:49:00 CDT, Lab Collect, Wellness examination  Hypercholesterolemia  HTN (hypertension)  Hyperglycemia, 08/24/21 16:48:00 CDT  Preventative Health Care Est 40-64 years [id number] PC, Wellness examination  HTN (hypertension)  Hypercholesterolemia  Hyperglycemia  Hematuria  Environmental allergies  Anxiety  Constipation  Ingrown right big toenail, [facility] - [facility], 08/24/21 16:41:00 CDT  ?  5.?Hematuria?R31.9  ?Patient denies any current symptoms; urinalysis pending.  Ordered:  Clinic  Follow up, *Est. 02/24/22 8:45:00 CST, Order for future visit, Constipation, [facility]  Preventative Health Care Est 40-64 years [id number] PC, Wellness examination  HTN (hypertension)  Hypercholesterolemia  Hyperglycemia  Hematuria  Environmental allergies  Anxiety  Constipation  Ingrown right big toenail, [facility] - [facility], 08/24/21 16:41:00 CDT  ?  6.?Environmental allergies?Z91.09  ?Stable.  Ordered:  Clinic Follow up, *Est. 02/24/22 8:45:00 CST, Order for future visit, Constipation, [facility]  Preventative Health Care Est 40-64 years [id number] PC, Wellness examination  HTN (hypertension)  Hypercholesterolemia  Hyperglycemia  Hematuria  Environmental allergies  Anxiety  Constipation  Ingrown right big toenail, [facility] - [facility], 08/24/21 16:41:00 CDT  ?  7.?Anxiety?F41.9  ?Stable; patient takes Xanax infrequently.  Ordered:  Clinic Follow up, *Est. 02/24/22 8:45:00 CST, Order for future visit, Constipation, [facility]  Preventative [facility] Care Est 40-64 years [id number] PC, Wellness examination  HTN (hypertension)  Hypercholesterolemia  Hyperglycemia  Hematuria  Environmental allergies  Anxiety  Constipation  Ingrown right big toenail, [facility] - [facility], 08/24/21 16:41:00 CDT  ?  8.?Constipation?K59.00  Well-controlled with?stool softeners and Metamucil.  Ordered:  Clinic Follow up, *Est. 02/24/22 8:45:00 CST, Order for future visit, Constipation, [facility]  Preventative Health Care Est 40-64 years [id number] PC, Wellness examination  HTN (hypertension)  Hypercholesterolemia  Hyperglycemia  Hematuria  Environmental allergies  Anxiety  Constipation  Ingrown right big toenail, [facility] - [facility], 08/24/21 16:41:00 CDT  ?  9.?Ingrown right big toenail?L60.0  ?Patient advised to soak?her toe to 3 times a day.  Keflex 500 mg 3 times daily.  Call if symptoms persist or worsen.  Ordered:  [facility] Health Care Est 40-64 years [id number] PC, Wellness examination  HTN (hypertension)  Hypercholesterolemia   Hyperglycemia  Hematuria  Environmental allergies  Anxiety  Constipation  Ingrown right big toenail, [facility] - [facility], 08/24/21 16:41:00 CDT  ?  Orders:  atorvastatin, See Instructions, TAKE 1 TABLET BY MOUTH DAILY, # 90 tab(s), 3 Refill(s), Pharmacy: [id number], 165, cm, Height/Length Dosing, 08/24/21 15:44:00 CDT, 76.7, kg, Weight Dosing, 08/24/21 15:44:00 CDT  cephalexin, 500 mg = 1 cap(s), Oral, TID, X 7 day(s), # 21 cap(s), 0 Refill(s), Pharmacy: [id number], 165, cm, Height/Length Dosing, 08/24/21 15:44:00 CDT, 76.7, kg, Weight Dosing, 08/24/21 15:44:00 CDT  lisinopril, See Instructions, TAKE 1 TABLET BY MOUTH DAILY, # 90 tab(s), 3 Refill(s), Pharmacy: [id number], 165, cm, Height/Length Dosing, 08/24/21 15:44:00 CDT, 76.7, kg, Weight Dosing, 08/24/21 15:44:00 CDT  traZODone, 50 mg = 1 tab(s), Oral, Once a day (at bedtime), # 14 tab(s), 0 Refill(s), [id number], 165, cm, Height/Length Dosing, 08/24/21 15:44:00 CDT, 76.7, kg, Weight Dosing, 08/24/21 15:44:00 CDT  Referrals  Clinic Follow up, *Est. 02/24/22 8:45:00 CST, Order for future visit, Constipation, [facility]  Clinic Follow-Up Wellness, *Est. 08/24/22 3:00:00 CDT, Order for future visit, Wellness examination, [facility]   Problem List/Past Medical History  Ongoing  Anxiety  Constipation  Diverticulitis  Environmental allergies  Hematuria  HTN (hypertension)  Hypercholesterolemia  Hyperglycemia  Insomnia  Obesity  Sleep apnea  Historical  Fracture of base of metacarpal of thumb  Fracture of great toe  Incomplete defecation  Inflammation of right sacroiliac joint  Polyp colon  Proximal humerus fracture  Right ankle sprain  Procedure/Surgical History  Arthroscopy Shoulder (Right) (07/16/2020)  Arthroscopy, shoulder, surgical; debridement, extensive (07/16/2020)  Arthroscopy, shoulder, surgical; decompression of subacromial space with partial acromioplasty, with coracoacromial ligament (ie, arch) release,  when performed (List separately in addition to code for primary procedure) (07/16/2020)  Arthroscopy, shoulder, surgical; with lysis and resection of adhesions, with or without manipulation (07/16/2020)  Injection(s), anesthetic agent(s) and/or steroid; brachial plexus (07/16/2020)  Introduction of Anesthetic Agent into Peripheral Nerves and Plexi, Percutaneous Approach (07/16/2020)  Release Right Shoulder Joint, Percutaneous Endoscopic Approach (07/16/2020)  Release Right Shoulder Joint, Percutaneous Endoscopic Approach (07/16/2020)  Removal of implant; deep (eg, buried wire, pin, screw, metal band, nail, kulwinder or plate) (07/16/2020)  ORIF Humerus Proximal (Right) (05/09/2019)  Pinning Percutaneous Finger (Left) (05/09/2019)  Reposition Left Metacarpal with Internal Fixation Device, Percutaneous Approach (05/09/2019)  Reposition Right Humeral Head with Internal Fixation Device, Open Approach (05/09/2019)  Reposition Right Toe Phalanx with Internal Fixation Device, Percutaneous Approach (05/09/2019)  Application of short arm splint (forearm to hand); static (05/05/2019)  Immobilization of Left Lower Arm using Splint (05/05/2019)  Cervical spinal fusion  fibroid/cyst removed from ovary  Hemorrhoidectomy   Medications  alPRAzolam 0.5 mg oral tablet, 0.5 mg= 1 tab(s), Oral, BID, PRN, 1 refills  atorvastatin 40 mg oral tablet, See Instructions, 3 refills  Ducosate Sodium, 100 mg, Oral, Daily  Emergen-C  Keflex 500 mg oral capsule, 500 mg= 1 cap(s), Oral, TID  lisinopril 10 mg oral tablet, See Instructions, 3 refills  Metamucil, 5.8 gm, Oral, Daily  traZODONE 50 mg oral tablet ( Desyrel ), 50 mg= 1 tab(s), Oral, Once a day (at bedtime)  Vitamin D3 2000 intl units (50 mcg) oral capsule, 150 mcg= 3 cap(s), Oral, Daily  zinc citrate 50 mg oral tablet, 1 tab, Oral, Daily  Allergies  codeine?(Itching, Hives)  Social History  Abuse/Neglect  No, 07/30/2021  Alcohol  Current, Beer, 1-2 times per week,  07/02/2020  Employment/School  Employed, Work/School description: [employer] [place] 08/24/2021  Exercise  Home/Environment  Living situation: Home/Independent. Home equipment: CPAP/BiPAP., 08/06/2018  Nutrition/Health  Low sodium, 07/02/2020  Regular, Caffeine intake amount: 1-2 CUPS OF COFFEE PER DAY., 08/06/2018  Substance Use  Never, 08/06/2018  Tobacco - Denies Tobacco Use, 12/20/2013  Never (less than 100 in lifetime), N/A, 07/30/2021  Family History  Hyperlipidemia.: Brother.  Hypertension.: Sister and Brother.  Primary malignant neoplasm of brain: Brother.  Primary malignant neoplasm of lung: Brother.  Immunizations  Vaccine Date Status   tetanus/diphtheria/pertussis, acel(Tdap) 08/06/2018 Given   pneumococcal 13-valent conjugate vaccine 12/14/2016 Recorded   tetanus-diphtheria toxoids 06/22/2015 Given   tetanus/diphtheria/pertussis, acel(Tdap) 11/01/2007 Recorded   Health Maintenance  Health Maintenance  ???Pending?(in the next year)  ??? ??OverDue  ??? ? ? ?ADL Screening due??05/13/21??and every 1??year(s)  ??? ? ? ?Hypertension Management-BMP due??08/19/21??and every 1??year(s)  ??? ??Due?  ??? ? ? ?Aspirin Therapy for CVD Prevention due??08/24/21??and every 1??year(s)  ??? ? ? ?Depression Screening due??08/24/21??Unknown Frequency  ??? ? ? ?Zoster Vaccine due??08/24/21??Unknown Frequency  ??? ??Due In Future?  ??? ? ? ?Obesity Screening not due until??01/01/22??and every 1??year(s)  ??? ? ? ?Alcohol Misuse Screening not due until??01/02/22??and every 1??year(s)  ??? ? ? ?Breast Cancer Screening not due until??01/19/22??and every 2??year(s)  ???Satisfied?(in the past 1 year)  ??? ??Satisfied?  ??? ? ? ?Alcohol Misuse Screening on??05/14/21.??Satisfied by Angelique Galo LPN  ??? ? ? ?Blood Pressure Screening on??08/24/21.??Satisfied by Sonya Srinivasan LPN  ??? ? ? ?Body Mass Index Check on??08/24/21.??Satisfied by Sonya Srinivasan LPN  ??? ? ? ?Diabetes Screening on??08/24/21.??Satisfied by  Faina Conn  ??? ? ? ?Hypertension Management-Blood Pressure on??08/24/21.??Satisfied by Sonya Srinivasan LPN  ??? ? ? ?Obesity Screening on??08/24/21.??Satisfied by Sonya Srinivasan LPN  ?

## 2022-05-03 NOTE — HISTORICAL [facility] CERNER
This is a historical note converted from [employer]. Formatting and pictures may have been removed.  Please reference [employer] for original formatting and attached multimedia. Chief Complaint  Annual wellness physical- NPO  History of Present Illness  Pt presents for Wellness exam.  She got laid off from the [employer] due to Covid.  She had recent R shoulder surgery with Gary SANCHEZ in July.  Appetite is normal.  No tobacco.  Social alcohol.  +coffee: 2 cups/day.  Colonoscopy 2017: Dr Sandrine SANCHEZ; multiple polyps, mild diverticulosis; repeat 5 years.  Gyn: Dr Cheryl Montanez MD--?MMG/PAP UTD.  Went to Derm on the 14th for a tick removal with labs. Currently taking Doxy.  Review of Systems  GENERAL: No weight loss, no?weight gain, no fever, no fatigue, no chills, no night sweats  HEENT: No sore throat, no ear pain, no sinus pressure, no nasal congestion, no rhinorrhea, no decreased hearing, +seasonal allergies  VISION: No vision changes, no blurry vision, no double vision, no glaucoma, no cataracts, +glasses-readers, +contacts  LAST EYE EXAM: due oct 2020  NECK: no LAD  CARDIAC: No chest pain, no palpitations, no dyspnea on exertion, no orthopnea  RESPIRATORY: No cough, no wheezing, no sputum production, no?SOB  GI: No abdominal pain, no N/V, no constipation, no diarrhea, no blood in stool,?( -)?family history of Colon Ca  : No dysuria, no hematuria, no frequency, no urgency, no incontinence, no vaginal discharge or abnormal vaginal bleeding  MUSC/SKEL: No myalgia, no weakness, no edema, no arthralgia, no joint swelling/effusions  SKIN: No rashes, no hives, no itching, no sores  NEURO: No HA, no numbness, no tingling, no weakness, no dizziness  PSYCH: +intermittentanxiety--well controlled with Xanax, no depression, no?irritability, no panic attacks,?no s/i, no h/i, no?hallucinations  ENDO: No polyuria, no polyphagia,? no polydipsia  HEME: No bruising, no bleeding disorders, no signs of anemia.?  Physical Exam  Vitals &  Measurements  T:?36.7? ?C (Oral)? HR:?57(Peripheral)? BP:?110/60? SpO2:?94%?  HT:?167.00?cm? WT:?77.700?kg? BMI:?27.86?  General: Well developed, well nourished in no apparent distress, alert and oriented x3  Skin:?No rash or abnormal?lesions  HEENT:?Normocephalic, PERRLA, EOMI, mouth WNL, throat WNL, nares normal, EAC and TM WNL bilaterally  Neck:?FROM, no LAD, no thyroid abnormalities?palpable  Chest:?CTA?bilaterally, no wheezes crackles or rubs  Cardiac: RRR,?no murmurs, rubs, gallops  Abdomen: Soft, nontender,?nondistended, NBSx4, no rebound tenderness or guarding, no HSM  Extremities:?No clubbing, cyanosis, or edema.? Joints WNL, +2 DP/PT pulses bilaterally  Neuro: No sensory or motor defects noted. CN II-XII intact. Gait WNL.  Genital: Defer to GYN  Assessment/Plan  1.?Wellness examination?Z00.00  CBC, CMP, Lipids, UA, TSH?ordered today.?  Colonoscopy 2017: Dr Jose Deluca; repeat 5 years.  Ortho: Gary SANCHEZ.  Gyn: Dr Cheryl Montanez.  Encourage pt to increase cardiovascular exercise and attempt to obtain at least 150 minutes of moderate aerobic exercise per week or 75 minutes of vigorous aerobic exercise weekly.??Weight bearing activities encouraged to increase bone density.?  Ordered:  CBC w/ Auto Diff, Routine collect, 08/19/20 9:32:00 CDT, Blood, Order for future visit, Stop date 08/19/20 9:32:00 CDT, Lab Collect, Wellness examination  HTN (hypertension)  Hypercholesterolemia  Sleep apnea, 08/19/20 9:32:00 CDT  Clinic Follow-Up Wellness, *Est. 08/19/21 3:00:00 CDT, Order for future visit, Wellness examination  HTN (hypertension)  Hypercholesterolemia  Anxiety  Constipation  Sleep apnea, HLink AFP  Comprehensive Metabolic Panel, Routine collect, 08/19/20 9:32:00 CDT, Blood, Order for future visit, Stop date 08/19/20 9:32:00 CDT, Lab Collect, Wellness examination  HTN (hypertension)  Hypercholesterolemia  Sleep apnea, 08/19/20 9:32:00 CDT  Lab Collection Request, 08/19/20 9:32:00 CDTDAVID -  AFP, 08/19/20 9:32:00 CDT, Wellness examination  HTN (hypertension)  Hypercholesterolemia  Anxiety  Constipation  Sleep apnea  Lipid Panel, Routine collect, 08/19/20 9:32:00 CDT, Blood, Order for future visit, Stop date 08/19/20 9:32:00 CDT, Lab Collect, Wellness examination  Hypercholesterolemia  HTN (hypertension), 08/19/20 9:32:00 CDT  Preventative Health Care Est 40-64 years [id number] PC, Wellness examination  HTN (hypertension)  Hypercholesterolemia  Anxiety  Constipation  Sleep apnea, HLINK AMB - AFP, 08/19/20 9:32:00 CDT  Thyroid Stimulating Hormone, Routine collect, 08/19/20 9:32:00 CDT, Blood, Order for future visit, Stop date 08/19/20 9:32:00 CDT, Lab Collect, Wellness examination  HTN (hypertension)  Anxiety  Constipation  Sleep apnea, 08/19/20 9:32:00 CDT  Urinalysis no Reflex, Routine collect, Urine, Order for future visit, 08/19/20 9:32:00 CDT, Stop date 08/19/20 9:32:00 CDT, Nurse collect, Wellness examination  HTN (hypertension)  ?  2.?HTN (hypertension)?I10  Stable and well controlled at this time. Continue current treatment. Limit salt in diet. Monitor BP at home.  Ordered:  CBC w/ Auto Diff, Routine collect, 08/19/20 9:32:00 CDT, Blood, Order for future visit, Stop date 08/19/20 9:32:00 CDT, Lab Collect, Wellness examination  HTN (hypertension)  Hypercholesterolemia  Sleep apnea, 08/19/20 9:32:00 CDT  Clinic Follow-Up Wellness, *Est. 08/19/21 3:00:00 CDT, Order for future visit, Wellness examination  HTN (hypertension)  Hypercholesterolemia  Anxiety  Constipation  Sleep apnea, HLink AFP  Comprehensive Metabolic Panel, Routine collect, 08/19/20 9:32:00 CDT, Blood, Order for future visit, Stop date 08/19/20 9:32:00 CDT, Lab Collect, Wellness examination  HTN (hypertension)  Hypercholesterolemia  Sleep apnea, 08/19/20 9:32:00 CDT  Lab Collection Request, 08/19/20 9:32:00 CDT, HLINK AMB - AFP, 08/19/20 9:32:00 CDT, Wellness examination  HTN (hypertension)   Hypercholesterolemia  Anxiety  Constipation  Sleep apnea  Lipid Panel, Routine collect, 08/19/20 9:32:00 CDT, Blood, Order for future visit, Stop date 08/19/20 9:32:00 CDT, Lab Collect, Wellness examination  Hypercholesterolemia  HTN (hypertension), 08/19/20 9:32:00 CDT  Preventative Health Care Est 40-64 years [id number] PC, Wellness examination  HTN (hypertension)  Hypercholesterolemia  Anxiety  Constipation  Sleep apnea, HLINK AMB - AFP, 08/19/20 9:32:00 CDT  Thyroid Stimulating Hormone, Routine collect, 08/19/20 9:32:00 CDT, Blood, Order for future visit, Stop date 08/19/20 9:32:00 CDT, Lab Collect, Wellness examination  HTN (hypertension)  Anxiety  Constipation  Sleep apnea, 08/19/20 9:32:00 CDT  Urinalysis no Reflex, Routine collect, Urine, Order for future visit, 08/19/20 9:32:00 CDT, Stop date 08/19/20 9:32:00 CDT, Nurse collect, Wellness examination  HTN (hypertension)  ?  3.?Hypercholesterolemia?E78.00  Continue current medicine. ?Follow low-cholesterol diet with?physical activity.??  Ordered:  CBC w/ Auto Diff, Routine collect, 08/19/20 9:32:00 CDT, Blood, Order for future visit, Stop date 08/19/20 9:32:00 CDT, Lab Collect, Wellness examination  HTN (hypertension)  Hypercholesterolemia  Sleep apnea, 08/19/20 9:32:00 CDT  Clinic Follow-Up Wellness, *Est. 08/19/21 3:00:00 CDT, Order for future visit, Wellness examination  HTN (hypertension)  Hypercholesterolemia  Anxiety  Constipation  Sleep apnea, HLink AFP  Comprehensive Metabolic Panel, Routine collect, 08/19/20 9:32:00 CDT, Blood, Order for future visit, Stop date 08/19/20 9:32:00 CDT, Lab Collect, Wellness examination  HTN (hypertension)  Hypercholesterolemia  Sleep apnea, 08/19/20 9:32:00 CDT  Lab Collection Request, 08/19/20 9:32:00 CDT, HLINK AMB - AFP, 08/19/20 9:32:00 CDT, Wellness examination  HTN (hypertension)  Hypercholesterolemia  Anxiety  Constipation  Sleep apnea  Lipid Panel, Routine collect, 08/19/20 9:32:00 CDT,  Blood, Order for future visit, Stop date 08/19/20 9:32:00 CDT, Lab Collect, Wellness examination  Hypercholesterolemia  HTN (hypertension), 08/19/20 9:32:00 CDT  [facility] Health Care Est 40-64 years [id number] PC, Wellness examination  HTN (hypertension)  Hypercholesterolemia  Anxiety  Constipation  Sleep apnea, HLINK AMB - AFP, 08/19/20 9:32:00 CDT  ?  4.?Anxiety?F41.9  Stable/well controlled with current treatment. Will continue to monitor.? Refilled Xanax. ED precautions per suicidal ideation,?homicidal ideation,?self-harm?and verbalized understanding.? If concerns or issues arise after office hours,?call 911 or report to emergency room; patient verbalized understanding.?  Ordered:  Clinic Follow-Up Wellness, *Est. 08/19/21 3:00:00 CDT, Order for future visit, Wellness examination  HTN (hypertension)  Hypercholesterolemia  Anxiety  Constipation  Sleep apnea, HLink AFP  Lab Collection Request, 08/19/20 9:32:00 CDT, HLINK AMB - AFP, 08/19/20 9:32:00 CDT, Wellness examination  HTN (hypertension)  Hypercholesterolemia  Anxiety  Constipation  Sleep apnea  [facility] Care Est 40-64 years [id number] PC, Wellness examination  HTN (hypertension)  Hypercholesterolemia  Anxiety  Constipation  Sleep apnea, HLINK AMB - AFP, 08/19/20 9:32:00 CDT  Thyroid Stimulating Hormone, Routine collect, 08/19/20 9:32:00 CDT, Blood, Order for future visit, Stop date 08/19/20 9:32:00 CDT, Lab Collect, Wellness examination  HTN (hypertension)  Anxiety  Constipation  Sleep apnea, 08/19/20 9:32:00 CDT  ?  5.?Constipation?K59.00  Stable/well controlled with current treatment. Will continue to monitor.?  Ordered:  Clinic Follow-Up Wellness, *Est. 08/19/21 3:00:00 CDT, Order for future visit, Wellness examination  HTN (hypertension)  Hypercholesterolemia  Anxiety  Constipation  Sleep apnea, HLink AFP  Lab Collection Request, 08/19/20 9:32:00 CDT, HLINK AMB - AFP, 08/19/20 9:32:00 CDT, Wellness examination  HTN  (hypertension)  Hypercholesterolemia  Anxiety  Constipation  Sleep apnea  Preventative Health Care Est 40-64 years [id number] PC, Wellness examination  HTN (hypertension)  Hypercholesterolemia  Anxiety  Constipation  Sleep apnea, HLINK AMB - AFP, 08/19/20 9:32:00 CDT  Thyroid Stimulating Hormone, Routine collect, 08/19/20 9:32:00 CDT, Blood, Order for future visit, Stop date 08/19/20 9:32:00 CDT, Lab Collect, Wellness examination  HTN (hypertension)  Anxiety  Constipation  Sleep apnea, 08/19/20 9:32:00 CDT  ?  6.?Sleep apnea?G47.30  Stable/well controlled with current treatment. Interested in new facial mask--followed by Audie SANCHEZ. Will continue to monitor.?  Ordered:  CBC w/ Auto Diff, Routine collect, 08/19/20 9:32:00 CDT, Blood, Order for future visit, Stop date 08/19/20 9:32:00 CDT, Lab Collect, Wellness examination  HTN (hypertension)  Hypercholesterolemia  Sleep apnea, 08/19/20 9:32:00 CDT  Clinic Follow-Up Wellness, *Est. 08/19/21 3:00:00 CDT, Order for future visit, Wellness examination  HTN (hypertension)  Hypercholesterolemia  Anxiety  Constipation  Sleep apnea, HLink AFP  Comprehensive Metabolic Panel, Routine collect, 08/19/20 9:32:00 CDT, Blood, Order for future visit, Stop date 08/19/20 9:32:00 CDT, Lab Collect, Wellness examination  HTN (hypertension)  Hypercholesterolemia  Sleep apnea, 08/19/20 9:32:00 CDT  Lab Collection Request, 08/19/20 9:32:00 CDT, HLINK AMB - AFP, 08/19/20 9:32:00 CDT, Wellness examination  HTN (hypertension)  Hypercholesterolemia  Anxiety  Constipation  Sleep apnea  Preventative Health Care Est 40-64 years [id number] PC, Wellness examination  HTN (hypertension)  Hypercholesterolemia  Anxiety  Constipation  Sleep apnea, HLINK AMB - AFP, 08/19/20 9:32:00 CDT  Thyroid Stimulating Hormone, Routine collect, 08/19/20 9:32:00 CDT, Blood, Order for future visit, Stop date 08/19/20 9:32:00 CDT, Lab Collect, Wellness examination  HTN (hypertension)  Anxiety   Constipation  Sleep apnea, 08/19/20 9:32:00 CDT  ?  Orders:  alPRAzolam, 0.5 mg = 1 tab(s), Oral, BID, PRN PRN as needed for anxiety, # 30 tab(s), 0 Refill(s), Pharmacy: [facility] #[id number], 167, cm, Height/Length Dosing, 08/19/20 9:01:00 CDT, 77.7, kg, Weight Dosing, 08/19/20 9:01:00 CDT  atorvastatin, See Instructions, TAKE 1 TABLET BY MOUTH DAILY, # 90 tab(s), 2 Refill(s), Pharmacy: [facility] #[id number], 167, cm, Height/Length Dosing, 08/19/20 9:01:00 CDT, 77.7, kg, Weight Dosing, 08/19/20 9:01:00 CDT  lisinopril, See Instructions, TAKE 1 TABLET BY MOUTH DAILY, # 90 tab(s), 2 Refill(s), Pharmacy: [facility] #[id number], 167, cm, Height/Length Dosing, 08/19/20 9:01:00 CDT, 77.7, kg, Weight Dosing, 08/19/20 9:01:00 CDT  Referrals  Clinic Follow-Up Wellness, *Est. 08/19/21 3:00:00 CDT, Order for future visit, Wellness examination  HTN (hypertension)  Hypercholesterolemia  Anxiety  Constipation  Sleep apnea, HLink AFP   Problem List/Past Medical History  Ongoing  Anxiety  Constipation  Diverticulitis  Environmental allergies  Hemorrhoid  High blood cholesterol level  HTN (hypertension)  Insomnia  Obesity  S/P arthroscopy of shoulder  Sleep apnea  Upper respiratory infection  Historical  Fracture of base of metacarpal of thumb  Fracture of great toe  Incomplete defecation  Inflammation of right sacroiliac joint  Polyp colon  Proximal humerus fracture  Right ankle sprain  Procedure/Surgical History  Arthroscopy Shoulder (Right) (07/16/2020)  Arthroscopy, shoulder, surgical; debridement, extensive (07/16/2020)  Arthroscopy, shoulder, surgical; decompression of subacromial space with partial acromioplasty, with coracoacromial ligament (ie, arch) release, when performed (List separately in addition to code for primary procedure) (07/16/2020)  Arthroscopy, shoulder, surgical; with lysis and resection of adhesions, with or without manipulation (07/16/2020)  Injection(s), anesthetic agent(s) and/or  steroid; brachial plexus (07/16/2020)  Introduction of Anesthetic Agent into Peripheral Nerves and Plexi, Percutaneous Approach (07/16/2020)  Release Right Shoulder Joint, Percutaneous Endoscopic Approach (07/16/2020)  Release Right Shoulder Joint, Percutaneous Endoscopic Approach (07/16/2020)  Removal of implant; deep (eg, buried wire, pin, screw, metal band, nail, kulwinder or plate) (07/16/2020)  ORIF Humerus Proximal (Right) (05/09/2019)  Pinning Percutaneous Finger (Left) (05/09/2019)  Reposition Left Metacarpal with Internal Fixation Device, Percutaneous Approach (05/09/2019)  Reposition Right Humeral Head with Internal Fixation Device, Open Approach (05/09/2019)  Reposition Right Toe Phalanx with Internal Fixation Device, Percutaneous Approach (05/09/2019)  Application of short arm splint (forearm to hand); static (05/05/2019)  Immobilization of Left Lower Arm using Splint (05/05/2019)  Cervical spinal fusion  fibroid/cyst removed from ovary  Hemorrhoidectomy   Medications  Aller-Chlor 4 mg oral tablet, 4 mg= 1 tab(s), Oral, BID  alPRAzolam 0.5 mg oral tablet, 0.5 mg= 1 tab(s), Oral, BID, PRN  atorvastatin 40 mg oral tablet, See Instructions, 2 refills  Controlled Delivery Probiotic, Oral, Daily  doxycycline hyclate 100 mg oral capsule, 100 mg= 1 cap(s), Oral, BID  lisinopril 10 mg oral tablet, See Instructions, 2 refills  Metamucil, 5.8 gm, Oral, Daily  polyethylene glycol 3350 ( MiraLax ), 17 gm, Oral, Daily  Allergies  codeine?(Itching, Hives)  Social History  Abuse/Neglect  No, Yes, Yes, 07/16/2020  No, 07/02/2020  Alcohol  Current, Beer, 1-2 times per week, 07/02/2020  Current, Beer, 1-2 times per year, 05/07/2019  Current, 1-2 times per week, 08/06/2018  Employment/School  Unemployed, 07/02/2020  Employed, Work/School description: OPERATIONS ., 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  Home equipment: CPAP/BiPAP., 08/06/2018  Nutrition/Health  Low sodium, 07/02/2020  Regular, Diet restrictions: no seeds/nuts., 05/07/2019  Regular, Caffeine intake amount: 1-2 CUPS OF COFFEE PER DAY., 08/06/2018  Substance Use  Never, 07/02/2020  Never, 08/06/2018  Tobacco - Denies Tobacco Use, 12/20/2013  Never (less than 100 in lifetime), N/A, 07/16/2020  Former smoker, quit more than 30 days ago, N/A, 07/02/2020  Family History  Hyperlipidemia.: Brother.  Hypertension.: Sister and Brother.  Primary malignant neoplasm of brain: Brother.  Primary malignant neoplasm of lung: Brother.  Immunizations  Vaccine Date Status   tetanus/diphtheria/pertussis, acel(Tdap) 08/06/2018 Given   pneumococcal 13-valent conjugate vaccine 12/14/2016 Recorded   tetanus-diphtheria toxoids 06/22/2015 Given   tetanus/diphtheria/pertussis, acel(Tdap) 11/01/2007 Recorded   Health Maintenance  Health Maintenance  ???Pending?(in the next year)  ??? ??Due?  ??? ? ? ?Aspirin Therapy for CVD Prevention due??08/19/20??and every 1??year(s)  ??? ? ? ?Depression Screening due??08/19/20??and every?  ??? ? ? ?Influenza Vaccine due??08/19/20??and every?  ??? ? ? ?Zoster Vaccine due??08/19/20??and every?  ??? ??Due In Future?  ??? ? ? ?Obesity Screening not due until??01/01/21??and every 1??year(s)  ??? ? ? ?Alcohol Misuse Screening not due until??01/02/21??and every 1??year(s)  ??? ? ? ?Cervical Cancer Screening not due until??02/21/21??and every 3??year(s)  ??? ? ? ?Breast Cancer Screening not due until??03/05/21??and every 2??year(s)  ??? ? ? ?ADL Screening not due until??05/13/21??and every 1??year(s)  ??? ? ? ?Hypertension Management-BMP not due until??07/08/21??and every 1??year(s)  ???Satisfied?(in the past 1 year)  ??? ??Satisfied?  ??? ? ? ?ADL Screening on??05/13/20.??Satisfied by Shantell Sloan  ??? ? ? ?Alcohol Misuse Screening on??06/08/20.??Satisfied by Amber Hollis  ??? ? ? ?Blood Pressure Screening  on??08/19/20.??Satisfied by Sonya Srinivasan LPN  ??? ? ? ?Body Mass Index Check on??08/19/20.??Satisfied by Sonya Srinivasan LPN  ??? ? ? ?Diabetes Screening on??07/08/20.??Satisfied by Enrique Ram  ??? ? ? ?Hypertension Management-Blood Pressure on??08/19/20.??Satisfied by Sonya Srinivasan LPN  ??? ? ? ?Lipid Screening on??08/29/19.??Satisfied by Zander Engle  ??? ? ? ?Obesity Screening on??08/19/20.??Satisfied by Sonya Srinivasan LPN  ?      Patients condition discussed the development nurse practitioner.?  I have reviewed and agree with plan of care and follow-up.

## 2022-05-03 NOTE — HISTORICAL [facility]
This is a historical note converted from Vonnie. Formatting and pictures may have been removed.  Please reference Vonnie for original formatting and attached multimedia. Chief Complaint  KIDNEY/BACK PAIN  History of Present Illness  Pt presents for onset of right lower back pain x 1.5 weeks. She feels it started after lifting a heavy load of clothes. She has had some sciatic symptoms on right side previously.  Review of Systems  She denies any dysuria, frequency, urgency or hematuria.  She states she drinks a lot of water.  Physical Exam  Vitals & Measurements  T:?36.8? ?C (Oral)? HR:?60(Peripheral)? BP:?130/70?  HT:?168?cm? WT:?78.1?kg? BMI:?27.67?  General: She is a well-developed well-nourished?white female no apparent distress. ?She has a normal gait.  Low back:?She can forward flex to her toes with discomfort, she has limitation of extension with discomfort, she has discomfort?with lateral flexion bilaterally.? She can toe and heel walk without difficulty. ?She has point tenderness over right sacroiliac joint area.? No midline tenderness noted.  Assessment/Plan  1.?Inflammation of right sacroiliac joint?M46.1  ?Patient declines any medication at this time.  She is currently doing physical therapy with?Calli Munoz?for her right shoulder.  I will send a prescription?for patient to do therapy?for her sacroiliac joint.  Ordered:  Office/Outpatient Visit Level 3 Established [id number] PC, Inflammation of right sacroiliac joint, [facility] - [facility], 12/05/19 17:07:00 CST  ?  2.?Immunization refused?Z28.21  ?Patient advised of benefits and risks of flu vaccine; she declines at this time.  ?  Orders:  Clinic Follow-up PRN, 12/05/19 17:04:00 CST, JOSE [facility]INK [facility] - [facility], Future Order  Clinic Follow-up PRN, 12/05/19 17:07:00 CST, INK [facility] - [facility], Future Order  External Referral, SIJ dysfunction, PT, Calli Munoz, They are already seeing patient for a humeral fracture. Please evaluate and treat for above., 12/05/19 17:04:00 CST,  Sacroiliac joint pain  Office/Outpatient Visit Level 3 Established [id number] PC, Sacroiliac joint pain, [facility] - [facility], 12/05/19 17:03:00 CST  Her?urine has?2-5 white cells;?it is otherwise normal.? She does not have any symptoms?of a urinary tract infection.  Referrals  External Referral, SIJ dysfunction, PT, Calli Borumat, They are already seeing patient for a humeral fracture. Please evaluate and treat for above., 12/05/19 17:04:00 CST, Sacroiliac joint pain  Clinic Follow-up PRN, 12/05/19 17:04:00 CST, [facility], Future Order  Clinic Follow-up PRN, 12/05/19 17:07:00 CST, [facility], Future Order   Problem List/Past Medical History  Ongoing  Anxiety  Constipation  Diverticulitis  Environmental allergies  Fracture of base of metacarpal of thumb  Fracture of great toe  Hemorrhoid  High blood cholesterol level  HTN (hypertension)  Incomplete defecation  Inflammation of right sacroiliac joint  Insomnia  Obesity  Polyp colon  Proximal humerus fracture  Right ankle sprain  Sleep apnea  Upper respiratory infection  Historical  No qualifying data  Procedure/Surgical History  ORIF Humerus Proximal (Right) (05/09/2019)  Pinning Percutaneous Finger (Left) (05/09/2019)  Reposition Left Metacarpal with Internal Fixation Device, Percutaneous Approach (05/09/2019)  Reposition Right Humeral Head with Internal Fixation Device, Open Approach (05/09/2019)  Reposition Right Toe Phalanx with Internal Fixation Device, Percutaneous Approach (05/09/2019)  Application of short arm splint (forearm to hand); static (05/05/2019)  Immobilization of Left Lower Arm using Splint (05/05/2019)  Cervical spinal fusion  Hemorrhoidectomy   Medications  acetaminophen-tramadol 325 mg-37.5 mg oral tablet, 1 tab(s), Oral, q4hr,? ?Not taking  aspirin 81 mg oral tablet, 81 mg= 1 tab(s), Oral, BID  atorvastatin 40 mg oral tablet, See Instructions, 11 refills  Colace 100 mg oral capsule, 100 mg= 1 cap(s), Oral, Daily  DIAZepam 5 mg oral tablet, 5  mg= 1 tab(s), Oral, TID  lisinopril 10 mg oral tablet, See Instructions, 11 refills  meloxicam 7.5 mg oral tablet, 7.5 mg= 1 tab(s), Oral, Daily,? ?Not taking  polyethylene glycol 3350 ( MiraLax ), 17 gm, Oral, Daily  Allergies  codeine?(Itching, Hives)  Social History  Abuse/Neglect  No, 12/05/2019  No, 09/13/2019  No, 08/21/2019  Alcohol  Current, Beer, 1-2 times per year, 05/07/2019  Current, 1-2 times per week, 08/06/2018  Employment/School  Employed, Work/School description: [employer]PER[employer] ., 08/06/2018  Exercise  Home/Environment  Lives with Alone., 05/07/2019  Living situation: Home/Independent. Home equipment: CPAP/BiPAP., 08/06/2018  Nutrition/Health  Regular, Diet restrictions: no seeds/nuts., 05/07/2019  Regular, Caffeine intake amount: 1-2 CUPS OF COFFEE PER DAY., 08/06/2018  Substance Use  Never, 08/06/2018  Tobacco - Denies Tobacco Use, 12/20/2013  Never (less than 100 in lifetime), N/A, 12/05/2019  Never (less than 100 in lifetime), N/A, 11/20/2019  Family History  Hyperlipidemia.: Brother.  Hypertension.: Sister and Brother.  Primary malignant neoplasm of brain: Brother.  Primary malignant neoplasm of lung: Brother.  Immunizations  Vaccine Date Status   tetanus/diphtheria/pertussis, acel(Tdap) 08/06/2018 Given   pneumococcal 13-valent conjugate vaccine 12/14/2016 Recorded   tetanus-diphtheria toxoids 06/22/2015 Given   tetanus/diphtheria/pertussis, acel(Tdap) 11/01/2007 Recorded   Health Maintenance  Health Maintenance  ???Pending?(in the next year)  ??? ??OverDue  ??? ? ? ?Diabetes Screening due??and every?  ??? ? ? ?Alcohol Misuse Screening due??01/01/19??and every 1??year(s)  ??? ??Due?  ??? ? ? ?ADL Screening due??12/05/19??and every 1??year(s)  ??? ? ? ?Depression Screening due??12/05/19??and every?  ??? ? ? ?Influenza Vaccine due??12/05/19??and every?  ??? ??Due In Future?  ??? ? ? ?Obesity Screening not due until??01/01/20??and every 1??year(s)  ??? ? ? ?Hypertension  Management-BMP not due until??05/10/20??and every 1??year(s)  ??? ? ? ?Aspirin Therapy for CVD Prevention not due until??05/11/20??and every 1??year(s)  ??? ? ? ?Blood Pressure Screening not due until??12/04/20??and every 1??year(s)  ??? ? ? ?Body Mass Index Check not due until??12/04/20??and every 1??year(s)  ??? ? ? ?Hypertension Management-Blood Pressure not due until??12/04/20??and every 1??year(s)  ???Satisfied?(in the past 1 year)  ??? ??Satisfied?  ??? ? ? ?Aspirin Therapy for CVD Prevention on??05/11/19.??Satisfied by Adeel Vega JR., MD  ??? ? ? ?Blood Pressure Screening on??12/05/19.??Satisfied by Sonya Srinivasan LPN  ??? ? ? ?Body Mass Index Check on??12/05/19.??Satisfied by Sonya Srinivasan LPN  ??? ? ? ?Breast Cancer Screening on??03/06/19.??Satisfied by Johanny Lay  ??? ? ? ?Diabetes Screening on??05/11/19.??Satisfied by Johanny Clemens  ??? ? ? ?Hypertension Management-BMP on??05/11/19.??Satisfied by Johanny Clemens  ??? ? ? ?Hypertension Management-Blood Pressure on??12/05/19.??Satisfied by Sonya Srinivasan LPN  ??? ? ? ?Influenza Vaccine on??01/24/19.??Satisfied by Sonya Srinivasan LPN  ??? ? ? ?Lipid Screening on??08/29/19.??Satisfied by Zander Engle  ??? ? ? ?Obesity Screening on??12/05/19.??Satisfied by Sonya Srinivasan LPN  ?  Lab Results  Test Name Test Result Date/Time   UA Appear Clear 12/05/2019 16:32 CST   UA Color Yellow 12/05/2019 16:32 CST   UA Spec Grav >1.030 (Abnormal) 12/05/2019 16:32 CST   UA Bili Negative UA 12/05/2019 16:32 CST   UA pH 5.5 12/05/2019 16:32 CST   UA Urobilinogen 0.2 12/05/2019 16:32 CST   UA Blood Negative UA 12/05/2019 16:32 CST   UA Glucose Negative UA 12/05/2019 16:32 CST   UA Ketones Negative UA 12/05/2019 16:32 CST   UA Protein Negative UA 12/05/2019 16:32 CST   UA Nitri Negative 12/05/2019 16:32 CST   UA Leuk Est Negative UA 12/05/2019 16:32 CST   UA WBC cnt 5-10 (Abnormal) 12/05/2019 16:32 CST   UA RBC None Seen 12/05/2019 16:32 CST   UA Bact None Seen  12/05/2019 16:32 CST   UA Squam Epithelial None Seen 12/05/2019 16:32 CST

## 2022-05-03 NOTE — HISTORICAL [facility]
This is a historical note converted from Vonnie. Formatting and pictures may have been removed.  Please reference Vonnie for original formatting and attached multimedia. Chief Complaint  ANNUAL WELLNESS PHYSICAL--- NPO  History of Present Illness  Pt presents for Wellness exam.  Sleep is improving.  Appetite is good.  Physically active. [place]. Walks a lot at work.  No tobacco.  Social etoh.  + coffee: 2 cups/day.  Colonoscopy 2017: Dr Jose Deluca; multiple polyps, mild diverticulosis.  Gyn: Dr Cheryl Montanez, Mammogram 3-18.  Review of Systems  Constitutional:?no?weight gain,?no?weight loss,?no?fatigue, ?no?fever, ?no?chills, ?no?weakness, ?no?trouble sleeping, + nite sweats  Eyes: ?no?vision loss/changes,?+?glasses?and contacts,??no?pain,??no?redness,??no?blurry or double vision,??no?flashing lights,??no?glaucoma,??no?cataracts  Last eye exam:?recently  Neck: ?no?lymphadenopathy,??no?thyroid abnormalities,??no?bruits,??no?stiffness  Ears:?no?decreased hearing,??no?tinnitus,??no?earache,??no?drainage?  Nose:?no?congestion,??no?rhinorrhea,??no?epistaxis,??no?sinus pressure, mild sinus issues lately  Throat/Oral:?no?sore throat,??no?hoarseness, ?no?dental caries,??no?gum bleeding,??no?oral lesions  Cardiovascular:?no?chest pain, palpitations, or tightness,?no?dyspnea with exertion,??no?orthopnea,??no?paroxysmal nocturnal dyspnea  Respiratory:?no?cough,?no?sputum,??no?hemoptysis,??no?dyspnea,??no?wheezing,??no?pleuritic chest pain?  Gastrointestinal:?no?abd pain,?no?nausea,?no?vomiting,??no?heartburn,??no?dysphagia or odynophagia,??no?diarrhea,??+?constipation,??no?melena,??no?hematochezia,?no?jaundice  Urinary:?+?frequency,??no?urgency,??no?burning or pain,?no?hematuria,??+?incontinence stress,??no?hesitancy,??no?incomplete voiding,??no?flank pain,??no?nocturia  Musculoskeletal:?no?myalgias,?no?arthralgias,?no?neck pain,??no?back pain,??no?swelling of extremities  Skin:?no?rashes,?no?sores,?no?non-healing  wounds, saw Derm  Neurologic:?no?headaches,?no?dizziness/lightheadedness,?no?tremors,?no?paresthesias,??no?seizures,??no?muscle weakness  Psychiatric:?no?depression/sadness,?no?anhedonia,?no?irritability,?no?suicidal ideations,?no?anxiety,?no?panic attacks  Endocrine:?no?hot or cold intolerance,?no?sweating,?no?polyuria,?no?polydipsia,?no?polyphagia  Hematologic:?no?bruising,??no?bleeding disorders?  Physical Exam  Vitals & Measurements  HR:?72(Peripheral)? BP:?100/50?  HT:?167?cm? HT:?167?cm? WT:?83.1?kg? WT:?83.1?kg? BMI:?29.8?  PHYSICAL EXAMINATION:  GENERAL: The patient is a well-developed, well-nourished female in no?apparent distress. She is alert and oriented x 4.  HEENT: Head is normocephalic and atraumatic. Extraocular muscles are intact. Pupils are equal, round, and reactive to light and accommodation. Nares are edematous and erythematous. Mouth is well hydrated and without lesions. Mucous membranes are moist. Posterior pharynx clear of any exudate or lesions.  NECK: Supple. No carotid bruits.? No lymphadenopathy or thyromegaly.  LUNGS: Clear to auscultation.  HEART: Regular rate and rhythm without murmur.  ABDOMEN: Soft, nontender, and nondistended.? Positive bowel sounds.? No hepatosplenomegaly was noted.  EXTREMITIES: Without any cyanosis, clubbing, rash, lesions or edema.  NEUROLOGIC: Cranial nerves II through XII are grossly intact.? No motor or sensory deficits.? Cerebellar function intact.  SKIN: No ulceration or induration present.  ?  ?  Assessment/Plan  1.?Wellness examination  ?Pt is doing well.  2.?HTN (hypertension)  ?Well controlled.  3.?Insomnia  ?Improving.  4.?High blood cholesterol level  ?Check today.  5.?Anxiety  ?Stable; she takes Xanax infrequently.  6.?Obesity  ?Pt advised to lose weight.  7.?Constipation  ?Stable on Miralax.  History of hepatic hemangioma; schedule follow up CT in September.   Problem List/Past Medical History  Ongoing  Anxiety  Constipation  High blood cholesterol  level  HTN (hypertension)  Insomnia  Obesity  Historical  No qualifying data  Procedure/Surgical History  Hemorrhoidectomy   Medications  alPRAzolam 0.5 mg oral tablet, 0.5 mg= 1 tab(s), Oral, Daily, PRN, 2 refills  atorvastatin 40 mg oral tablet, 40 mg= 1 tab(s), Oral, Daily, 5 refills  lisinopril 10 mg oral tablet, 10 mg= 1 tab(s), Oral, Daily, 5 refills  polyethylene glycol 3350 ( MiraLax ), 17 gm, Oral, Daily  Allergies  codeine  Social History  Alcohol  Current, 1-2 times per week, 08/06/2018  Employment/School  Employed, Work/School description: [employer]PERATIONS ., 08/06/2018  Exercise  Home/Environment  Living situation: Home/Independent. Home equipment: CPAP/BiPAP., 08/06/2018  Nutrition/Health  Regular, Caffeine intake amount: 1-2 CUPS OF COFFEE PER DAY., 08/06/2018  Substance Abuse  Never, 08/06/2018  Tobacco - Denies Tobacco Use, 12/20/2013  Former smoker, Cigarettes, Stopped age 44 Years., 02/22/2018  Immunizations  Vaccine Date Status   tetanus/diphtheria/pertussis, acel(Tdap) 08/06/2018 Given   pneumococcal 13-valent conjugate vaccine 12/14/2016 Recorded   tetanus-diphtheria toxoids 06/22/2015 Given   tetanus/diphtheria/pertussis, acel(Tdap) 11/01/2007 Recorded   Health Maintenance  Health Maintenance  ???Pending?(in the next year)  ??? ??OverDue  ??? ? ? ?Diabetes Screening due??and every?  ??? ??Due?  ??? ? ? ?Alcohol Misuse Screening due??08/10/18??and every 1??year(s)  ??? ? ? ?Aspirin Therapy for CVD Prevention due??08/10/18??and every 1??year(s)  ??? ? ? ?Cervical Cancer Screening due??08/10/18??and every?  ??? ? ? ?Depression Screening due??08/10/18??and every?  ??? ? ? ?Influenza Vaccine due??08/10/18??and every?  ??? ??Due In Future?  ??? ? ? ?Blood Pressure Screening not due until??08/06/19??and every 1??year(s)  ??? ? ? ?Body Mass Index Check not due until??08/06/19??and every 1??year(s)  ??? ? ? ?Hypertension Management-Blood Pressure not due until??08/06/19??and every  1??year(s)  ??? ? ? ?Hypertension Management-BMP not due until??08/06/19??and every 1??year(s)  ??? ? ? ?Obesity Screening not due until??08/06/19??and every 1??year(s)  ???Satisfied?(in the past 1 year)  ??? ??Satisfied?  ??? ? ? ?Blood Pressure Screening on??08/06/18.??Satisfied by Sonya Srinivasan  ??? ? ? ?Body Mass Index Check on??08/06/18.??Satisfied by Sonya Srinivasan  ??? ? ? ?Breast Cancer Screening on??03/07/18.??Satisfied by Sonya Srinivasan  ??? ? ? ?Colorectal Screening on??09/18/17.??Satisfied by Sonya Srinivasan  ??? ? ? ?Diabetes Screening on??08/06/18.??Satisfied by Carissa Hoffman  ??? ? ? ?Hypertension Management-Blood Pressure on??08/06/18.??Satisfied by Sonya Srinivasan  ??? ? ? ?Lipid Screening on??08/06/18.??Satisfied by Papa Nunez MD  ??? ? ? ?Obesity Screening on??08/06/18.??Satisfied by Sonya Srinivasan  ??? ? ? ?Tetanus Vaccine on??08/06/18.??Satisfied by Sonya Srinivasan  ?  ?  Diagnostic Results  EKG: NSR, wnl

## 2022-08-05 ENCOUNTER — OFFICE VISIT (OUTPATIENT)
Dept: URGENT CARE | Facility: CLINIC | Age: 62
End: 2022-08-05
Payer: COMMERCIAL

## 2022-08-05 VITALS
SYSTOLIC BLOOD PRESSURE: 126 MMHG | HEART RATE: 80 BPM | TEMPERATURE: 98 F | RESPIRATION RATE: 18 BRPM | BODY MASS INDEX: 29.02 KG/M2 | DIASTOLIC BLOOD PRESSURE: 85 MMHG | WEIGHT: 170 LBS | OXYGEN SATURATION: 97 % | HEIGHT: 64 IN

## 2022-08-05 DIAGNOSIS — L30.9 DERMATITIS: Primary | ICD-10-CM

## 2022-08-05 PROCEDURE — 1159F MED LIST DOCD IN RCRD: CPT | Mod: CPTII,,, | Performed by: FAMILY MEDICINE

## 2022-08-05 PROCEDURE — 4010F ACE/ARB THERAPY RXD/TAKEN: CPT | Mod: CPTII,,, | Performed by: FAMILY MEDICINE

## 2022-08-05 PROCEDURE — 3079F DIAST BP 80-89 MM HG: CPT | Mod: CPTII,,, | Performed by: FAMILY MEDICINE

## 2022-08-05 PROCEDURE — 96372 THER/PROPH/DIAG INJ SC/IM: CPT | Mod: S$PBB,,, | Performed by: FAMILY MEDICINE

## 2022-08-05 PROCEDURE — 3008F PR BODY MASS INDEX (BMI) DOCUMENTED: ICD-10-PCS | Mod: CPTII,,, | Performed by: FAMILY MEDICINE

## 2022-08-05 PROCEDURE — 4010F PR ACE/ARB THEARPY RXD/TAKEN: ICD-10-PCS | Mod: CPTII,,, | Performed by: FAMILY MEDICINE

## 2022-08-05 PROCEDURE — 1159F PR MEDICATION LIST DOCUMENTED IN MEDICAL RECORD: ICD-10-PCS | Mod: CPTII,,, | Performed by: FAMILY MEDICINE

## 2022-08-05 PROCEDURE — 3074F PR MOST RECENT SYSTOLIC BLOOD PRESSURE < 130 MM HG: ICD-10-PCS | Mod: CPTII,,, | Performed by: FAMILY MEDICINE

## 2022-08-05 PROCEDURE — 99213 OFFICE O/P EST LOW 20 MIN: CPT | Mod: S$PBB,25,, | Performed by: FAMILY MEDICINE

## 2022-08-05 PROCEDURE — 3074F SYST BP LT 130 MM HG: CPT | Mod: CPTII,,, | Performed by: FAMILY MEDICINE

## 2022-08-05 PROCEDURE — 3008F BODY MASS INDEX DOCD: CPT | Mod: CPTII,,, | Performed by: FAMILY MEDICINE

## 2022-08-05 PROCEDURE — 3079F PR MOST RECENT DIASTOLIC BLOOD PRESSURE 80-89 MM HG: ICD-10-PCS | Mod: CPTII,,, | Performed by: FAMILY MEDICINE

## 2022-08-05 PROCEDURE — 96372 PR INJECTION,THERAP/PROPH/DIAG2ST, IM OR SUBCUT: ICD-10-PCS | Mod: S$PBB,,, | Performed by: FAMILY MEDICINE

## 2022-08-05 PROCEDURE — 99213 PR OFFICE/OUTPT VISIT, EST, LEVL III, 20-29 MIN: ICD-10-PCS | Mod: S$PBB,25,, | Performed by: FAMILY MEDICINE

## 2022-08-05 RX ORDER — ASCORBIC ACID/MULTIVIT-MIN 1000 MG
EFFERVESCENT POWDER IN PACKET ORAL
COMMUNITY
Start: 2021-08-24

## 2022-08-05 RX ORDER — ATORVASTATIN CALCIUM 40 MG/1
TABLET, FILM COATED ORAL
COMMUNITY
Start: 2021-08-24 | End: 2022-08-17

## 2022-08-05 RX ORDER — LISINOPRIL 10 MG/1
TABLET ORAL
COMMUNITY
Start: 2021-08-24 | End: 2022-11-09

## 2022-08-05 RX ORDER — BENZOYL PEROXIDE 50 MG/ML
LIQUID TOPICAL
COMMUNITY
Start: 2022-07-29 | End: 2022-09-09

## 2022-08-05 RX ORDER — KETOCONAZOLE 20 MG/ML
SHAMPOO, SUSPENSION TOPICAL
COMMUNITY
Start: 2022-07-15 | End: 2022-09-09

## 2022-08-05 RX ORDER — ALPRAZOLAM 0.5 MG/1
0.5 TABLET ORAL
COMMUNITY
Start: 2021-12-28 | End: 2022-08-18 | Stop reason: SDUPTHER

## 2022-08-05 RX ORDER — BETAMETHASONE SODIUM PHOSPHATE AND BETAMETHASONE ACETATE 3; 3 MG/ML; MG/ML
6 INJECTION, SUSPENSION INTRA-ARTICULAR; INTRALESIONAL; INTRAMUSCULAR; SOFT TISSUE
Status: COMPLETED | OUTPATIENT
Start: 2022-08-05 | End: 2022-08-05

## 2022-08-05 RX ORDER — HYDROXYZINE PAMOATE 25 MG/1
25 CAPSULE ORAL EVERY 8 HOURS PRN
Qty: 9 CAPSULE | Refills: 0 | Status: SHIPPED | OUTPATIENT
Start: 2022-08-05 | End: 2022-08-08

## 2022-08-05 RX ORDER — CLINDAMYCIN PHOSPHATE 10 MG/ML
SOLUTION TOPICAL
COMMUNITY
Start: 2022-07-15 | End: 2022-09-09

## 2022-08-05 RX ADMIN — BETAMETHASONE SODIUM PHOSPHATE AND BETAMETHASONE ACETATE 6 MG: 3; 3 INJECTION, SUSPENSION INTRA-ARTICULAR; INTRALESIONAL; INTRAMUSCULAR; SOFT TISSUE at 07:08

## 2022-08-05 NOTE — PROGRESS NOTES
"Subjective:       Patient ID: Shawna Nguyen is a 62 y.o. female.    Vitals:  height is 5' 4" (1.626 m) and weight is 77.1 kg (170 lb). Her temperature is 98.2 °F (36.8 °C). Her blood pressure is 126/85 and her pulse is 80. Her respiration is 18 and oxygen saturation is 97%.     Chief Complaint: Rash (Pt c/o itchy, painful rash on scalp, arms, back, abdomen and groin x1mo. Alleviating factors used include rx shampoo, cortisone 10 crm, peroxide w/ no improvement. )    HPI  ROS    Objective:      Physical Exam      Assessment:       No diagnosis found.      Plan:         There are no diagnoses linked to this encounter.               "

## 2022-08-05 NOTE — PATIENT INSTRUCTIONS
Free and clear detergent, avoid fragrance.  Avoid sweating and hot showers.  Vistaril at bedtime.  Cerave or Cetaphil lotion. Steroid shot given today.    Rash should gradually improve.

## 2022-09-09 PROBLEM — Z00.00 ENCOUNTER FOR WELLNESS EXAMINATION IN ADULT: Status: ACTIVE | Noted: 2022-09-09

## 2022-09-19 ENCOUNTER — HISTORICAL (OUTPATIENT)
Dept: ADMINISTRATIVE | Facility: HOSPITAL | Age: 62
End: 2022-09-19
Payer: COMMERCIAL

## 2022-11-19 ENCOUNTER — OFFICE VISIT (OUTPATIENT)
Dept: URGENT CARE | Facility: CLINIC | Age: 62
End: 2022-11-19
Payer: COMMERCIAL

## 2022-11-19 VITALS
OXYGEN SATURATION: 98 % | DIASTOLIC BLOOD PRESSURE: 90 MMHG | SYSTOLIC BLOOD PRESSURE: 138 MMHG | HEART RATE: 70 BPM | RESPIRATION RATE: 20 BRPM | WEIGHT: 160 LBS | HEIGHT: 66 IN | TEMPERATURE: 99 F | BODY MASS INDEX: 25.71 KG/M2

## 2022-11-19 DIAGNOSIS — R05.9 COUGH, UNSPECIFIED TYPE: ICD-10-CM

## 2022-11-19 DIAGNOSIS — J32.9 SINUSITIS, UNSPECIFIED CHRONICITY, UNSPECIFIED LOCATION: Primary | ICD-10-CM

## 2022-11-19 DIAGNOSIS — J02.9 SORE THROAT: ICD-10-CM

## 2022-11-19 LAB
CTP QC/QA: YES
MOLECULAR STREP A: NEGATIVE
POC MOLECULAR INFLUENZA A AGN: NEGATIVE
POC MOLECULAR INFLUENZA B AGN: NEGATIVE
SARS-COV-2 RDRP RESP QL NAA+PROBE: NEGATIVE

## 2022-11-19 PROCEDURE — 87635 SARS-COV-2 COVID-19 AMP PRB: CPT | Mod: QW,,,

## 2022-11-19 PROCEDURE — 4010F PR ACE/ARB THEARPY RXD/TAKEN: ICD-10-PCS | Mod: CPTII,,,

## 2022-11-19 PROCEDURE — 3080F DIAST BP >= 90 MM HG: CPT | Mod: CPTII,,,

## 2022-11-19 PROCEDURE — 3008F PR BODY MASS INDEX (BMI) DOCUMENTED: ICD-10-PCS | Mod: CPTII,,,

## 2022-11-19 PROCEDURE — 4010F ACE/ARB THERAPY RXD/TAKEN: CPT | Mod: CPTII,,,

## 2022-11-19 PROCEDURE — 1159F MED LIST DOCD IN RCRD: CPT | Mod: CPTII,,,

## 2022-11-19 PROCEDURE — 1160F RVW MEDS BY RX/DR IN RCRD: CPT | Mod: CPTII,,,

## 2022-11-19 PROCEDURE — 1160F PR REVIEW ALL MEDS BY PRESCRIBER/CLIN PHARMACIST DOCUMENTED: ICD-10-PCS | Mod: CPTII,,,

## 2022-11-19 PROCEDURE — 3080F PR MOST RECENT DIASTOLIC BLOOD PRESSURE >= 90 MM HG: ICD-10-PCS | Mod: CPTII,,,

## 2022-11-19 PROCEDURE — 3075F PR MOST RECENT SYSTOLIC BLOOD PRESS GE 130-139MM HG: ICD-10-PCS | Mod: CPTII,,,

## 2022-11-19 PROCEDURE — 87651 STREP A DNA AMP PROBE: CPT | Mod: QW,,,

## 2022-11-19 PROCEDURE — 3008F BODY MASS INDEX DOCD: CPT | Mod: CPTII,,,

## 2022-11-19 PROCEDURE — 87635: ICD-10-PCS | Mod: QW,,,

## 2022-11-19 PROCEDURE — 87651 POCT STREP A MOLECULAR: ICD-10-PCS | Mod: QW,,,

## 2022-11-19 PROCEDURE — 87502 INFLUENZA DNA AMP PROBE: CPT | Mod: QW,,,

## 2022-11-19 PROCEDURE — 99213 OFFICE O/P EST LOW 20 MIN: CPT | Mod: ,,,

## 2022-11-19 PROCEDURE — 87502 POCT INFLUENZA A/B MOLECULAR: ICD-10-PCS | Mod: QW,,,

## 2022-11-19 PROCEDURE — 1159F PR MEDICATION LIST DOCUMENTED IN MEDICAL RECORD: ICD-10-PCS | Mod: CPTII,,,

## 2022-11-19 PROCEDURE — 3075F SYST BP GE 130 - 139MM HG: CPT | Mod: CPTII,,,

## 2022-11-19 PROCEDURE — 99213 PR OFFICE/OUTPT VISIT, EST, LEVL III, 20-29 MIN: ICD-10-PCS | Mod: ,,,

## 2022-11-19 RX ORDER — AMOXICILLIN AND CLAVULANATE POTASSIUM 875; 125 MG/1; MG/1
1 TABLET, FILM COATED ORAL EVERY 12 HOURS
Qty: 14 TABLET | Refills: 0 | Status: SHIPPED | OUTPATIENT
Start: 2022-11-19 | End: 2022-11-26

## 2022-11-19 NOTE — PROGRESS NOTES
"Subjective:       Patient ID: Shawna Nguyen is a 62 y.o. female.    Vitals:  height is 5' 6" (1.676 m) and weight is 72.6 kg (160 lb). Her oral temperature is 98.7 °F (37.1 °C). Her blood pressure is 138/90 (abnormal) and her pulse is 70. Her respiration is 20 and oxygen saturation is 98%.     Chief Complaint: Cough ( Cough, runny nose, sore throat, right earache, x 2 days)    A 63 y/o female presents to the clinic with c/o Cough, runny nose, sore throat, and right earache, x 2 days. She denies any denies any sob, cp, n/v/d, abdominal complaints, rash, difficulty swallowing, neck stiffness, or changes in intake or output.       Cough  Associated symptoms include ear pain and a sore throat. Pertinent negatives include no fever, shortness of breath or wheezing.   Constitution: Negative for fever.   HENT:  Positive for ear pain, congestion and sore throat.    Respiratory:  Positive for cough. Negative for shortness of breath, wheezing and asthma.    Gastrointestinal: Negative.    Genitourinary: Negative.    Musculoskeletal: Negative.    Skin: Negative.    Allergic/Immunologic: Negative for asthma.     Objective:      Physical Exam   Constitutional: She is oriented to person, place, and time. She appears well-developed. She is cooperative.  Non-toxic appearance. She does not appear ill. No distress.   HENT:   Head: Normocephalic and atraumatic.   Ears:   Right Ear: Hearing and external ear normal. A middle ear effusion is present.   Left Ear: Hearing and external ear normal. There is cerumen present.   Nose: Congestion present.   Mouth/Throat: Mucous membranes are normal. Mucous membranes are moist. Posterior oropharyngeal erythema present. Oropharynx is clear.   Eyes: Conjunctivae and lids are normal.   Neck: Trachea normal and phonation normal. Neck supple. No edema present. No erythema present. No neck rigidity present.   Cardiovascular: Normal rate and normal heart sounds.   Pulmonary/Chest: Effort normal and " breath sounds normal. No respiratory distress. She has no decreased breath sounds. She has no wheezes.   Abdominal: Normal appearance.   Neurological: She is alert and oriented to person, place, and time. She exhibits normal muscle tone.   Skin: Skin is warm, dry, intact, not diaphoretic and no rash. Capillary refill takes less than 2 seconds.   Psychiatric: Her speech is normal and behavior is normal. Mood and thought content normal.   Nursing note and vitals reviewed.         Previous History      Review of patient's allergies indicates:   Allergen Reactions    Codeine Hives and Itching       Past Medical History:   Diagnosis Date    Anxiety     Constipation     Diverticulitis     Environmental allergies     Hematuria     High cholesterol     HTN (hypertension)     Hyperglycemia     Insomnia     Low back pain with sciatica      Current Outpatient Medications   Medication Instructions    acetaminophen (TYLENOL EX STR RAPID RELEASE ORAL) Oral    ALPRAZolam (XANAX) 0.5 mg, Oral, Daily PRN    ascorbic acid-multivit-min (EMERGEN-C) 1,000 mg PwEP   0 Refill(s)    atorvastatin (LIPITOR) 40 mg, Oral, Daily    cholecalciferol, vitamin D3, (VITAMIN D3) 50 mcg (2,000 unit) Cap capsule Oral, Daily    docusate sodium (COLACE) 100 mg, Oral, 2 times daily    fexofenadine (ALLEGRA) 60 mg, Oral, Daily    hydrOXYzine HCL (ATARAX) 25 mg, Oral, 3 times daily    ibuprofen (ADVIL,MOTRIN) 200 mg, Oral, Every 6 hours PRN    lisinopriL 10 MG tablet TAKE ONE TABLET BY MOUTH ONCE DAILY    psyllium (METAMUCIL) powder Oral, Daily, Take 1 Tablespoon every day     Past Surgical History:   Procedure Laterality Date    Casirivi and imdevi inj   07/30/2021    CERVICAL FUSION      HEMORRHOID SURGERY      ORIF HUMERUS FRACTURE      OVARIAN CYST REMOVAL      PERCUTANEOUS PINNING OF FINGER      Removal of implant; deep (eg, buried wire, pin, screw, metal band, nail, kulwinder or plate)  07/16/2020    Reposition Right Toe Phalanx with Internal Fixation  "Device, Percutaneous Approach  05/09/2019    SHOULDER ARTHROSCOPY       Family History   Problem Relation Age of Onset    No Known Problems Mother     No Known Problems Father     Hypertension Sister     Hypertension Brother     Sleep apnea Brother     Hyperlipidemia Brother     Brain cancer Brother     Lung cancer Brother        Social History     Tobacco Use    Smoking status: Former    Smokeless tobacco: Never   Substance Use Topics    Alcohol use: Yes     Comment: 1-2 times per week    Drug use: Never        Physical Exam      Vital Signs Reviewed   BP (!) 138/90   Pulse 70   Temp 98.7 °F (37.1 °C) (Oral)   Resp 20   Ht 5' 6" (1.676 m)   Wt 72.6 kg (160 lb)   SpO2 98%   BMI 25.82 kg/m²        Procedures    Procedures     Labs     Results for orders placed or performed in visit on 11/19/22   POCT COVID-19 Rapid Screening   Result Value Ref Range    POC Rapid COVID Negative Negative     Acceptable Yes    POCT Influenza A/B Molecular   Result Value Ref Range    POC Molecular Influenza A Ag Negative Negative, Not Reported    POC Molecular Influenza B Ag Negative Negative, Not Reported     Acceptable Yes    POCT Strep A, Molecular   Result Value Ref Range    Molecular Strep A, POC Negative Negative     Acceptable Yes        Assessment:       1. Sinusitis, unspecified chronicity, unspecified location    2. Sore throat    3. Cough, unspecified type          Plan:         Sinusitis, unspecified chronicity, unspecified location    Sore throat  -     POCT COVID-19 Rapid Screening  -     POCT Influenza A/B Molecular  -     POCT Strep A, Molecular    Cough, unspecified type  -     POCT COVID-19 Rapid Screening  -     POCT Influenza A/B Molecular  Covid, flu, strep negative      Continue taking an allergy pill daily such as claritin, zyrtec, allegrea or xyzal. Also start using a nasal steroid spray such as flonase or nasacort daily. You can take Coricidin HBP for congestion " as it is safe . They can be purchased over the counter. If oral steroids were prescribed, start them tomorrow morning. Monitor for fever. Take tylenol/acetaminophen or ibuprofen as needed. Rest and hydrate. If symptoms persist or worsen, return to clinic or seek medical attention immediately.     As discussed this is most likely a viral sinusitis and symptoms will resolve with OTC treatment. As requested, antibiotic called in, hold on to the rx and if symptoms worsen or persist you may start it.

## 2022-11-19 NOTE — PATIENT INSTRUCTIONS
Continue taking an allergy pill daily such as claritin, zyrtec, allegrea or xyzal. Also start using a nasal steroid spray such as flonase or nasacort daily. You can take Coricidin HBP for congestion as it is safe . They can be purchased over the counter. If oral steroids were prescribed, start them tomorrow morning. Monitor for fever. Take tylenol/acetaminophen or ibuprofen as needed. Rest and hydrate. If symptoms persist or worsen, return to clinic or seek medical attention immediately.     As discussed this is most likely a viral sinusitis and symptoms will resolve with OTC treatment. As requested, antibiotic called in, hold on to the rx and if symptoms worsen or persist you may start it.

## 2022-11-19 NOTE — LETTER
November 19, 2022      [facility] [place]  [address]  [facility] [address]  Phone: [phone]       Patient: Shawna Nguyen   YOB: 1960  Date of Visit: 11/19/2022    To Whom It May Concern:    Bianka Nguyen  was at [facility] on 11/19/2022. Please excsue the patient for the dates of: 11/17/2022 ~ 11/21/2022. The patient may return to work/school on 11/21/2022 with no restrictions. If you have any questions or concerns, or if I can be of further assistance, please do not hesitate to contact me.    Sincerely,    Gege Garvey MA

## 2022-12-12 PROBLEM — Z00.00 ENCOUNTER FOR WELLNESS EXAMINATION IN ADULT: Status: RESOLVED | Noted: 2022-09-09 | Resolved: 2022-12-12

## 2024-03-25 PROBLEM — Z00.00 ENCOUNTER FOR WELLNESS EXAMINATION IN ADULT: Status: RESOLVED | Noted: 2022-09-09 | Resolved: 2024-03-25

## 2024-08-07 ENCOUNTER — OFFICE VISIT (OUTPATIENT)
Dept: PRIMARY CARE CLINIC | Facility: CLINIC | Age: 64
End: 2024-08-07

## 2024-08-07 VITALS
OXYGEN SATURATION: 97 % | SYSTOLIC BLOOD PRESSURE: 138 MMHG | HEART RATE: 60 BPM | DIASTOLIC BLOOD PRESSURE: 89 MMHG | BODY MASS INDEX: 28.72 KG/M2 | TEMPERATURE: 98 F | HEIGHT: 66 IN | WEIGHT: 178.69 LBS

## 2024-08-07 DIAGNOSIS — M54.42 CHRONIC LEFT-SIDED LOW BACK PAIN WITH LEFT-SIDED SCIATICA: ICD-10-CM

## 2024-08-07 DIAGNOSIS — K59.04 CHRONIC IDIOPATHIC CONSTIPATION: ICD-10-CM

## 2024-08-07 DIAGNOSIS — F51.01 PRIMARY INSOMNIA: ICD-10-CM

## 2024-08-07 DIAGNOSIS — G89.29 CHRONIC LEFT-SIDED LOW BACK PAIN WITH LEFT-SIDED SCIATICA: ICD-10-CM

## 2024-08-07 DIAGNOSIS — F41.9 ANXIETY: ICD-10-CM

## 2024-08-07 DIAGNOSIS — I10 PRIMARY HYPERTENSION: Primary | ICD-10-CM

## 2024-08-07 PROCEDURE — 99213 OFFICE O/P EST LOW 20 MIN: CPT | Mod: ,,, | Performed by: FAMILY MEDICINE

## 2024-08-07 RX ORDER — HYDROCODONE BITARTRATE AND ACETAMINOPHEN 7.5; 325 MG/1; MG/1
1 TABLET ORAL EVERY 6 HOURS PRN
Qty: 15 TABLET | Refills: 0 | Status: SHIPPED | OUTPATIENT
Start: 2024-08-07

## 2024-08-07 RX ORDER — ALPRAZOLAM 0.5 MG/1
0.5 TABLET ORAL DAILY PRN
Qty: 30 TABLET | Refills: 1 | Status: SHIPPED | OUTPATIENT
Start: 2024-08-07

## 2024-08-07 RX ORDER — CYCLOBENZAPRINE HCL 5 MG
TABLET ORAL
Qty: 60 TABLET | Refills: 2 | Status: SHIPPED | OUTPATIENT
Start: 2024-08-07

## 2024-08-27 DIAGNOSIS — F41.9 ANXIETY: ICD-10-CM

## 2024-08-27 RX ORDER — ALPRAZOLAM 0.5 MG/1
0.5 TABLET ORAL DAILY PRN
Qty: 30 TABLET | Refills: 1 | Status: SHIPPED | OUTPATIENT
Start: 2024-08-27

## 2024-11-25 DIAGNOSIS — G89.29 CHRONIC LEFT-SIDED LOW BACK PAIN WITH LEFT-SIDED SCIATICA: Primary | ICD-10-CM

## 2024-11-25 DIAGNOSIS — M54.42 CHRONIC LEFT-SIDED LOW BACK PAIN WITH LEFT-SIDED SCIATICA: Primary | ICD-10-CM

## 2024-11-25 RX ORDER — CYCLOBENZAPRINE HCL 5 MG
TABLET ORAL
Qty: 60 TABLET | Refills: 0 | Status: SHIPPED | OUTPATIENT
Start: 2024-11-25

## 2025-01-13 DIAGNOSIS — I10 PRIMARY HYPERTENSION: ICD-10-CM

## 2025-01-14 RX ORDER — ATORVASTATIN CALCIUM 40 MG/1
40 TABLET, FILM COATED ORAL
Qty: 30 TABLET | Refills: 0 | Status: SHIPPED | OUTPATIENT
Start: 2025-01-14

## 2025-01-14 RX ORDER — LISINOPRIL 10 MG/1
10 TABLET ORAL
Qty: 30 TABLET | Refills: 0 | Status: SHIPPED | OUTPATIENT
Start: 2025-01-14

## 2025-02-04 ENCOUNTER — TELEPHONE (OUTPATIENT)
Dept: PRIMARY CARE CLINIC | Facility: CLINIC | Age: 65
End: 2025-02-04

## 2025-02-04 DIAGNOSIS — I10 PRIMARY HYPERTENSION: ICD-10-CM

## 2025-02-04 RX ORDER — LISINOPRIL 10 MG/1
10 TABLET ORAL DAILY
Qty: 90 TABLET | Refills: 2 | Status: SHIPPED | OUTPATIENT
Start: 2025-02-04

## 2025-02-04 RX ORDER — LISINOPRIL 10 MG/1
10 TABLET ORAL DAILY
Qty: 90 TABLET | Refills: 2 | Status: SHIPPED | OUTPATIENT
Start: 2025-02-04 | End: 2025-02-04 | Stop reason: SDUPTHER

## 2025-02-04 NOTE — TELEPHONE ENCOUNTER
----- Message from Korin sent at 2/3/2025  4:44 PM CST -----  .Type:  RX Refill Request    Who Called: pt  Please call back   Refill or New Rx:refill   RX Name and Strength:lisinopriL 10 MG tablet  How is the patient currently taking it? (ex. 1XDay):1/day  Is this a 30 day or 90 day RX:90 would like a 90 day supply  Preferred Pharmacy with phone number: [facility] in [place] p#[phone]  Local or Mail Order:Out of State  Ordering Provider:Enrique  Would the patient rather a call back or a response via [facility]?   Best Call Back Number:[phone]   Additional Information: lisinopriL 10 MG tablet     Please send to [facility] [address] [address]   Please call back Patient medication was mailed to her but it has been delayed and she is out

## 2025-02-05 DIAGNOSIS — Z00.00 WELLNESS EXAMINATION: Primary | ICD-10-CM

## 2025-03-12 NOTE — PROGRESS NOTES
"Subjective:       Patient ID: Shawna Nguyen is a 62 y.o. female.    Vitals:  height is 5' 4" (1.626 m) and weight is 77.1 kg (170 lb). Her temperature is 98.2 °F (36.8 °C). Her blood pressure is 126/85 and her pulse is 80. Her respiration is 18 and oxygen saturation is 97%.     Chief Complaint: Rash (Pt c/o itchy, painful rash on scalp, arms, back, abdomen and groin x1mo. Alleviating factors used include rx shampoo, cortisone 10 crm, peroxide w/ no improvement. )    No fever, no drainage from the rash, no known sick exposures.       Constitution: Negative for fever.   Eyes: Negative for eye pain.   Gastrointestinal: Negative for abdominal pain.   Musculoskeletal: Negative for joint pain.   Skin: Positive for rash.       Objective:      Physical Exam   HENT:   Mouth/Throat: Mucous membranes are moist.   Cardiovascular: Normal rate, regular rhythm and normal pulses.   Pulmonary/Chest: Effort normal.   Abdominal: Normal appearance.   Neurological: no focal deficit. She is alert.   Skin:         Comments: Scattered millimetric erythematous papular rash to B antecubitals, anterior neck and back.    Psychiatric: Mood normal.      Comments: tangential   Nursing note and vitals reviewed.        Assessment:       1. Dermatitis          Plan:         Dermatitis  -     betamethasone acetate-betamethasone sodium phosphate injection 6 mg  -     hydrOXYzine pamoate (VISTARIL) 25 MG Cap; Take 1 capsule (25 mg total) by mouth every 8 (eight) hours as needed (as needed for itch, causes sedation).  Dispense: 9 capsule; Refill: 0                   " Name: Verito Fallon      : 1937      MRN: [id number]  Encounter Provider: Robbie Warner DO  Encounter Date: 3/12/2025   Encounter department: [facility]  :  Assessment & Plan  Hospital discharge follow-up         Other depression  Component of grief  Encouraged patient to establish with counseling services  Uptitrate Lexapro to 10 mg p.o. daily    Orders:    escitalopram (LEXAPRO) 10 mg tablet; Take 1 tablet (10 mg total) by mouth daily    Constipation, unspecified constipation type  Has been having regular bowel movements  Senna Or MiraLAX as needed  Orders:    senna (SENOKOT) 8.6 mg; Take 2 tablets (17.2 mg total) by mouth daily for 7 days    Paroxysmal atrial fibrillation (HCC)  Reminded to schedule follow-up with cardiology  Rate and rhythm controlled  Anticoagulated with Xarelto       Coronary artery disease involving other coronary artery bypass graft without angina pectoris  Reminded to schedule follow-up with cardiology  Blood pressure stable  Continue daily statin  She is continued on Effient as well       Carotid stenosis, asymptomatic, bilateral  Continue antihypertensive regimen and daily statin       Chronic combined systolic and diastolic congestive heart failure (HCC)  Wt Readings from Last 3 Encounters:   25 52.3 kg (115 lb 3.2 oz)   25 48.7 kg (107 lb 6.4 oz)   24 53 kg (116 lb 12.8 oz)     Bumex as needed, reviewed as needed instructions with daughter               PAD (peripheral artery disease) (HCC)  Continue antihypertensive regimen and daily statin       Renal artery stenosis (HCC)  Will need to schedule follow-up with vascular       Dementia, unspecified dementia severity, unspecified dementia type, unspecified whether behavioral, psychotic, or mood disturbance or anxiety (HCC)  Geriatrics consulted during recent hospital stay       Chronic kidney disease, stage 3b (HCC)  Lab Results   Component Value Date    EGFR 62 2025    EGFR 46  "02/17/2025    EGFR 56 02/14/2025    CREATININE 0.85 02/19/2025    CREATININE 1.08 02/17/2025    CREATININE 0.93 02/14/2025            Mixed hyperlipidemia  Continue Lipitor milligram p.o. nightly             Depression Screening and Follow-up Plan: Patient's depression screening was positive with a PHQ-9 score of 7.   Patient with underlying depression and was advised to continue current medications as prescribed.     Follow-up in 6 months and sooner as needed.    History of Present Illness   HPI    Patient presents for follow-up after hospitalization and rehab stay.  She presents today with her daughter.  Patient denies any active thoughts to harm herself.  Review of Systems    As noted in HPI     Objective   /70 (BP Location: Left arm, Patient Position: Sitting, Cuff Size: Standard)   Pulse 76   Temp 98.2 °F (36.8 °C) (Temporal)   Resp 18   Ht 4' 11\" (1.499 m)   Wt 52.3 kg (115 lb 3.2 oz)   SpO2 96%   BMI 23.27 kg/m²      Physical Exam  Vitals reviewed.   Constitutional:       General: She is not in acute distress.     Appearance: Normal appearance.   HENT:      Head: Normocephalic and atraumatic.      Mouth/Throat:      Mouth: Mucous membranes are moist.      Pharynx: Oropharynx is clear.   Eyes:      Conjunctiva/sclera: Conjunctivae normal.   Cardiovascular:      Rate and Rhythm: Normal rate and regular rhythm.      Pulses: Normal pulses.      Heart sounds: Normal heart sounds.   Pulmonary:      Effort: Pulmonary effort is normal.      Breath sounds: Normal breath sounds.   Abdominal:      General: Abdomen is flat. Bowel sounds are normal.      Palpations: Abdomen is soft.      Tenderness: There is no abdominal tenderness.   Musculoskeletal:      Cervical back: Neck supple.      Right lower leg: No edema.      Left lower leg: No edema.   Skin:     General: Skin is warm and dry.   Neurological:      Mental Status: She is alert. Mental status is at baseline.   Psychiatric:         Mood and Affect: " Affect is tearful.         Thought Content: Thought content does not include homicidal or suicidal plan.

## 2025-03-31 ENCOUNTER — TELEPHONE (OUTPATIENT)
Dept: PRIMARY CARE CLINIC | Facility: CLINIC | Age: 65
End: 2025-03-31

## 2025-03-31 DIAGNOSIS — E78.00 HIGH CHOLESTEROL: Primary | ICD-10-CM

## 2025-03-31 DIAGNOSIS — I10 PRIMARY HYPERTENSION: ICD-10-CM

## 2025-03-31 RX ORDER — ATORVASTATIN CALCIUM 40 MG/1
40 TABLET, FILM COATED ORAL DAILY
Qty: 30 TABLET | Refills: 3 | Status: SHIPPED | OUTPATIENT
Start: 2025-03-31 | End: 2025-04-02 | Stop reason: SDUPTHER

## 2025-03-31 NOTE — TELEPHONE ENCOUNTER
----- Message from Tej sent at 3/31/2025 10:37 AM CDT -----  .Who Called: Shawna NguyenRefill or New Rx:RefillRX Name and Strength:atorvastatin (LIPITOR) 40 MG tabletHow is the patient currently taking it? (ex. 1XDay):1xdayIs this a 30 day or 90 day RX:30Local or Mail Order:LocalList of preferred pharmacies on file (remove unneeded): [id number] - [address] AT [facility] [address] different Pharmacy is requested, enter Pharmacy information here including location and phone number: na Ordering Provider:PCPPreferred Method of Contact: Phone CallPatient's Preferred Phone Number on File: [phone] Best Call Back Number, if different:Additional Information:

## 2025-04-02 DIAGNOSIS — E78.00 HIGH CHOLESTEROL: ICD-10-CM

## 2025-04-02 RX ORDER — ATORVASTATIN CALCIUM 40 MG/1
40 TABLET, FILM COATED ORAL DAILY
Qty: 30 TABLET | Refills: 3 | Status: SHIPPED | OUTPATIENT
Start: 2025-04-02

## 2025-04-12 NOTE — PROGRESS NOTES
..Annual Exam (Labs not done can't afford labs at this point)       HISTORY OF PRESENT ILLNESS    This 64 y.o. female patient presented to the clinic today for a wellness CPX.  She has been feeling okay.   She moved to [place], [place]  She has not been sleeping well secondary to second older brother with liver cancer and and bleeding. She takes alprazolam as needed.  Her appetite has been decreased. She has been eating healthy.   She is single.   She has not been working.    She is inactive secondary to sciatica.   She does not smoke.    She has alcohol infrequently.   Gyn: Dr. Cheryl Montanez; has not seen her recently.  Last mammogram 05/12/2022.  Colonoscopy: Dr. Jose Deluca; 10/19/2022; lipoma, polyp x1, moderate diverticulosis, follow-up in 7 years    The patient's Health Maintenance was reviewed and the following appears to be due at this time:   Health Maintenance Due   Topic Date Due    Hepatitis C Screening  Never done    HIV Screening  Never done    Shingles Vaccine (1 of 2) Never done    Pneumococcal Vaccines (Age 50+) (2 of 2 - PPSV23) 12/14/2017    RSV Vaccine (Age 60+ and Pregnant patients) (1 - Risk 60-74 years 1-dose series) Never done    Mammogram  05/03/2023    Influenza Vaccine (1) 09/01/2024    COVID-19 Vaccine (3 - 2024-25 season) 09/01/2024    Cervical Cancer Screening  04/19/2025       ..  Past Medical History:   Diagnosis Date    Anxiety     Diverticulitis     Environmental allergies     Hematuria     High cholesterol     Hyperglycemia           ..  Past Surgical History:   Procedure Laterality Date    Casirivi and imdevi inj   07/30/2021    CERVICAL FUSION      HEMORRHOID SURGERY      ORIF HUMERUS FRACTURE      OVARIAN CYST REMOVAL      PERCUTANEOUS PINNING OF FINGER      Removal of implant; deep (eg, buried wire, pin, screw, metal band, nail, kulwinder or plate)  07/16/2020    Reposition Right Toe Phalanx with Internal Fixation Device, Percutaneous  Approach  05/09/2019    SHOULDER ARTHROSCOPY            Current Outpatient Medications   Medication Instructions    ALPRAZolam (XANAX) 0.5 mg, Oral, Daily PRN    atorvastatin (LIPITOR) 40 mg, Oral, Daily    cholecalciferol, vitamin D3, (VITAMIN D3) 50 mcg (2,000 unit) Cap capsule Daily    cyclobenzaprine (FLEXERIL) 5 MG tablet TAKE ONE TABLET BY MOUTH TWICE DAILY FOR MUSCLE SPASM    docusate sodium (COLACE) 100 mg, Daily    fexofenadine (ALLEGRA) 60 mg, Daily    ibuprofen (ADVIL,MOTRIN) 200 mg, Every 6 hours PRN    lisinopriL 10 mg, Oral, Daily    psyllium (METAMUCIL) powder Daily         ..Social History[1]       ..  Family History   Problem Relation Name Age of Onset    No Known Problems Mother      No Known Problems Father      Hypertension Sister      Hypertension Brother      Sleep apnea Brother      Hyperlipidemia Brother      Brain cancer Brother      Lung cancer Brother      No Known Problems Brother            ..  Review of patient's allergies indicates:   Allergen Reactions    Codeine Hives and Itching          ..  Immunization History   Administered Date(s) Administered    COVID-19 Vaccine 11/26/2021    COVID-19, MRNA, LN-S, PF (MODERNA FULL 0.5 ML DOSE) 10/28/2021    Pneumococcal Conjugate - 13 Valent 12/14/2016    Td - PF (ADULT) 06/22/2015    Tdap 11/01/2007, 08/06/2018          REVIEW OF SYSTEMS:    GENERAL:   no unexplained wt gain/loss,  fever, occasional fatigue, chills, + night sweats or weakness  HEENT: no sore throat, ear pain, sinus pressure, nasal congestion, or rhinorrhea  VISION: no vision changes, glaucoma, cataracts, + glasses and contacts  CARDIAC: no chest pain, palpitations, dyspnea on exertion, orthopnea  RESPIRATORY:  cough secondary to sinus drainage, wheezing, sputum production, or SOB  GI: no abdominal pain, n&v, occasional heartburn, constipation, diarrhea,  blood in stool or  (--)family history of colon cancer    : no dysuria, hematuria, + frequency,  + urgency, incontinence,   "vaginal discharge,  abnormal vaginal bleeding  MUSC/SKEL:  no myalgia, weakness, edema,  arthralgia, or joint effusion, + left sciatica  SKIN:  No rash, hives, itching or sores  NEURO:  No headaches, numbness,  tingling, weakness, or dizziness  PSYCH:  + anxiety,  depression, feels down at times,   irritability,  suicidal ideation or hallucinations  ENDO:  No polyuria, polydipsia,  polyphagia  HEME:  No bruising,  lymphadenopathy, bleeding disorders, no anemia       PHYSICAL EXAM:    Visit Vitals  /82   Pulse 71   Temp 97.6 °F (36.4 °C)   Ht 5' 6" (1.676 m)   Wt 79 kg (174 lb 1.6 oz)   SpO2 95%   BMI 28.10 kg/m²       GENERAL:  Well-developed well-nourished white female in NAD, alert and oriented x 3  SKIN:  no rash or abnormal appearing skin lesions  HEENT:  PERRLA, EOMI, mouth wnl, throat wnl, EAC and TM wnl bilaterally  NECK:  FROM, no lymphadenopathy, no thyroid abnormalities palpable  CHEST:  CTA bilaterally no wheezes, crackles or rubs  CARDIAC:  RRR, no murmurs audible  ABDOMEN:  Soft, nontender, nondistended, NBSx4, no rebound or guarding, no HSM  EXTREMITIES:  no clubbing, cyanosis, or edema.  joints wnl. +2 DP/PT pulse bilaterally  NEURO:  no sensory or motor deficits noted. CN II-XII intact. Gait wnl.           ASSESSMENT AND PLAN     1. Encounter for wellness examination in adult  Overview:  Patient presents for wellness examination.    She has been feeling okay.  She is having a lot of problems with left-sided sciatica.  She has not sleeping well secondary to pain.  Her anxiety is increased secondary to pain, unemployment and financial issues.  Last mammogram 05/12/2022.    Last colonoscopy 10/19/2022; lipoma, polyp x1, moderate diverticulosis, follow-up in 7 years.    No labs today secondary to finances.    Follow-up office visit in 6 months. In meantime patient is to keep me updated with her condition.    Assessment & Plan:  Patient presents for wellness examination.    She has been feeling " okay.    She continues to have low back pain and left-sided sciatica.    Patient encouraged to continue to exercise regularly and do core training.    Her anxiety has been doing well until 1 of her brothers became quite ill.  Last mammogram 05/12/2022.    Last colonoscopy 10/19/2022; lipoma, polyp x1, moderate diverticulosis, follow-up in 7 years.    No labs today secondary to finances.    Follow-up in 1 year.        2. Primary hypertension  Overview:  Controlled; continue current medication.  Refills sent.    Limit sodium consumption.    08/07/2024: Her blood pressure is controlled; continue lisinopril.  Limit sodium consumption.    Patient encouraged to lose weight.    Assessment & Plan:  Her blood pressure is controlled; continue lisinopril.    Limit sodium consumption.    Patient encouraged to lose weight.    Orders:  -     lisinopriL 10 MG tablet; Take 1 tablet (10 mg total) by mouth once daily.  Dispense: 30 tablet; Refill: 11    3. High cholesterol  Overview:  Patient has been compliant medications; refills sent.    Continue low-cholesterol/low-fat diet.    Will check lipid profile at a future date.    Assessment & Plan:  Patient has been compliant medications; refills sent.    Continue low-cholesterol/low-fat diet.    Will check lipid profile at a future date.    Orders:  -     atorvastatin (LIPITOR) 40 MG tablet; Take 1 tablet (40 mg total) by mouth once daily.  Dispense: 30 tablet; Refill: 11    4. Anxiety  Overview:  Has been increased secondary to current back pain, unemployed status and financial issues.  She takes alprazolam infrequently; refills sent.    Assessment & Plan:  Her anxiety has been manageable for the most part; she takes alprazolam infrequently.  She currently has a brother who is quite sick; she is very anxious about this.    Orders:  -     ALPRAZolam (XANAX) 0.5 MG tablet; Take 1 tablet (0.5 mg total) by mouth daily as needed for Anxiety.  Dispense: 30 tablet; Refill: 2    5. Chronic  idiopathic constipation  Overview:  Stable; continue Colace and Metamucil.    08/07/2024: Stable; continue Colace and Metamucil.    Patient takes MiraLax as needed.    Assessment & Plan:  Stable; continue Colace and Metamucil.    Patient takes MiraLax as needed.      6. Primary insomnia  Overview:  Patient has not been sleeping secondary to back pain.    Trial of Elavil 25 mg: Take 1 tablet in the evening.    Patient to call with update.    08/07/2024:  Patient continues to have problems with sleeping; she can not get comfortable with her back and leg.    She takes amitriptyline infrequently; patient encouraged to take amitriptyline more regularly so that she can obtain good rest.  I advised her that sleep is important for her mood and pain control.    Assessment & Plan:  Patient has not been sleeping well recently secondary to worrying about her brother's health condition.  She takes alprazolam infrequently.      7. Environmental allergies  Overview:  Stable; continue current medications.    Assessment & Plan:  Patient takes extended release Allegra as needed.      8. Chronic left-sided low back pain with left-sided sciatica  Overview:  Patient has been having a lot of issues with left sided sciatica.  She takes ibuprofen infrequently.    She declines to take any pain medication.    She is intolerant of tramadol.    Start Flexeril 5 mg: Take 1 tablet once twice a day as needed for back pain.    08/07/2024: Patient continues to have low back pain and left-sided sciatica.    Continue ibuprofen as needed.    Continue cyclobenzaprine as needed.    Norco 7.5: Take 1 tablet every 6 hours as needed for severe pain; potential risks and side effects discussed with patient.   15 x 0.  Continue stretches.      Assessment & Plan:  Patient continues to have mild left low back pain.  She has left sciatica regularly.    Patient does stretches regularly; patient encouraged to continue to stretch regularly and do core  exercises.    Orders:  -     cyclobenzaprine (FLEXERIL) 5 MG tablet; TAKE ONE TABLET BY MOUTH TWICE DAILY FOR MUSCLE SPASM  Dispense: 60 tablet; Refill: 1    9. Hyperglycemia  Assessment & Plan:  Limit intake of sugary foods and refined carbohydrates.    Patient encouraged to lose weight.    We will check A1c when patient can do labs.           ..Follow up in about 1 year (around 4/16/2026) for Wellness.     Future Appointments   Date Time Provider Department Center   4/22/2026  3:00 PM Papa Nunez MD [facility]                    [1]   Social History  Socioeconomic History    Marital status: Single    Number of children: 0   Occupational History    Occupation: unemployed   Tobacco Use    Smoking status: Former     Average packs/day: 1 pack/day for 23.0 years (23.0 ttl pk-yrs)     Types: Cigarettes     Start date: 1980    Smokeless tobacco: Never   Substance and Sexual Activity    Alcohol use: Not Currently     Comment: occass evry 3-4 months    Drug use: Never    Sexual activity: Not Currently     Social Drivers of Health     Financial Resource Strain: High Risk (4/16/2025)    Overall Financial Resource Strain (CARDIA)     Difficulty of Paying Living Expenses: Hard   Food Insecurity: Food Insecurity Present (4/16/2025)    Hunger Vital Sign     Worried About Running Out of Food in the Last Year: Sometimes true     Ran Out of Food in the Last Year: Sometimes true   Transportation Needs: No Transportation Needs (4/16/2025)    PRAPARE - Transportation     Lack of Transportation (Medical): No     Lack of Transportation (Non-Medical): No   Physical Activity: Unknown (4/16/2025)    Exercise Vital Sign     Days of Exercise per Week: Patient declined     Minutes of Exercise per Session: 10 min   Stress: No Stress Concern Present (4/16/2025)    Citizen of [place] of Occupational Health - Occupational Stress Questionnaire     Feeling of Stress : Not at all   Housing Stability: High Risk (4/16/2025)    Housing  Stability Vital Sign     Unable to Pay for Housing in the Last Year: No     Homeless in the Last Year: Yes

## 2025-04-16 ENCOUNTER — OFFICE VISIT (OUTPATIENT)
Dept: PRIMARY CARE CLINIC | Facility: CLINIC | Age: 65
End: 2025-04-16

## 2025-04-16 VITALS
BODY MASS INDEX: 27.98 KG/M2 | OXYGEN SATURATION: 95 % | WEIGHT: 174.13 LBS | HEIGHT: 66 IN | DIASTOLIC BLOOD PRESSURE: 82 MMHG | SYSTOLIC BLOOD PRESSURE: 124 MMHG | TEMPERATURE: 98 F | HEART RATE: 71 BPM

## 2025-04-16 DIAGNOSIS — F51.01 PRIMARY INSOMNIA: ICD-10-CM

## 2025-04-16 DIAGNOSIS — Z91.09 ENVIRONMENTAL ALLERGIES: ICD-10-CM

## 2025-04-16 DIAGNOSIS — F41.9 ANXIETY: ICD-10-CM

## 2025-04-16 DIAGNOSIS — R73.9 HYPERGLYCEMIA: ICD-10-CM

## 2025-04-16 DIAGNOSIS — G89.29 CHRONIC LEFT-SIDED LOW BACK PAIN WITH LEFT-SIDED SCIATICA: ICD-10-CM

## 2025-04-16 DIAGNOSIS — M54.42 CHRONIC LEFT-SIDED LOW BACK PAIN WITH LEFT-SIDED SCIATICA: ICD-10-CM

## 2025-04-16 DIAGNOSIS — K59.04 CHRONIC IDIOPATHIC CONSTIPATION: ICD-10-CM

## 2025-04-16 DIAGNOSIS — E78.00 HIGH CHOLESTEROL: ICD-10-CM

## 2025-04-16 DIAGNOSIS — Z00.00 ENCOUNTER FOR WELLNESS EXAMINATION IN ADULT: Primary | ICD-10-CM

## 2025-04-16 DIAGNOSIS — I10 PRIMARY HYPERTENSION: ICD-10-CM

## 2025-04-16 RX ORDER — LISINOPRIL 10 MG/1
10 TABLET ORAL DAILY
Qty: 30 TABLET | Refills: 11 | Status: SHIPPED | OUTPATIENT
Start: 2025-04-16 | End: 2026-04-11

## 2025-04-16 RX ORDER — CYCLOBENZAPRINE HCL 5 MG
TABLET ORAL
Qty: 60 TABLET | Refills: 1 | Status: SHIPPED | OUTPATIENT
Start: 2025-04-16

## 2025-04-16 RX ORDER — ATORVASTATIN CALCIUM 40 MG/1
40 TABLET, FILM COATED ORAL DAILY
Qty: 30 TABLET | Refills: 11 | Status: SHIPPED | OUTPATIENT
Start: 2025-04-16

## 2025-04-16 RX ORDER — ALPRAZOLAM 0.5 MG/1
0.5 TABLET ORAL DAILY PRN
Qty: 30 TABLET | Refills: 2 | Status: SHIPPED | OUTPATIENT
Start: 2025-04-16

## 2025-04-16 NOTE — ASSESSMENT & PLAN NOTE
Her anxiety has been manageable for the most part; she takes alprazolam infrequently.  She currently has a brother who is quite sick; she is very anxious about this.

## 2025-04-16 NOTE — ASSESSMENT & PLAN NOTE
Patient has been compliant medications; refills sent.    Continue low-cholesterol/low-fat diet.    Will check lipid profile at a future date.

## 2025-04-16 NOTE — ASSESSMENT & PLAN NOTE
Her blood pressure is controlled; continue lisinopril.    Limit sodium consumption.    Patient encouraged to lose weight.

## 2025-04-16 NOTE — ASSESSMENT & PLAN NOTE
Patient continues to have mild left low back pain.  She has left sciatica regularly.    Patient does stretches regularly; patient encouraged to continue to stretch regularly and do core exercises.

## 2025-04-16 NOTE — ASSESSMENT & PLAN NOTE
Patient has not been sleeping well recently secondary to worrying about her brother's health condition.  She takes alprazolam infrequently.   RW delivery to patient's bedside

## 2025-04-16 NOTE — ASSESSMENT & PLAN NOTE
Patient presents for wellness examination.    She has been feeling okay.    She continues to have low back pain and left-sided sciatica.    Patient encouraged to continue to exercise regularly and do core training.    Her anxiety has been doing well until 1 of her brothers became quite ill.  Last mammogram 05/12/2022.    Last colonoscopy 10/19/2022; lipoma, polyp x1, moderate diverticulosis, follow-up in 7 years.    No labs today secondary to finances.    Follow-up in 1 year.

## 2025-04-16 NOTE — ASSESSMENT & PLAN NOTE
Limit intake of sugary foods and refined carbohydrates.    Patient encouraged to lose weight.    We will check A1c when patient can do labs.

## 2025-05-09 ENCOUNTER — DOCUMENTATION ONLY (OUTPATIENT)
Facility: CLINIC | Age: 65
End: 2025-05-09

## 2025-07-21 ENCOUNTER — PATIENT OUTREACH (OUTPATIENT)
Facility: CLINIC | Age: 65
End: 2025-07-21

## 2025-07-21 DIAGNOSIS — Z09 NEED FOR CASE MANAGEMENT FOLLOW-UP: Primary | ICD-10-CM

## 2025-07-21 NOTE — PROGRESS NOTES
Population Health. Out Reach. Reviewing patient's chart for quality metrics. I attempt pt outreach re: mmg, no answer. Pt return call right after. Discussed mmg due, pt reports she does not have health insurance and can not afford to get done at this time, noted and confirmed with pt she lives in [place], pt reports she is living between [place] and [place] and that she will continue to keep Dr. Papa Nunez as her pcp for now. Discussed with pt they may have program that can assist with mmg and pap, pt agreed and ok with sending referral for to try for assistance for mmg and pap  Health Maintenance Topic(s) Outreach Outcomes & Actions Taken:    Breast Cancer Screening - Outreach Outcomes & Actions Taken  : due for mmg, last noted     Cervical Cancer Screening - Outreach Outcomes & Actions Taken  : lenin sent to central team for pap smear report to Dr. Cheryl Montanez       Additional Notes:  Pcp appt: lv- 4/16/25, nv- 4/22/26 at 3 pm           Care Management, Digital Medicine, and/or Education Referrals      Next Steps - Referral Actions: Referral place for OPCM to [facility] for SDOH Food Insecurity & Transportation Needs if applicable  and referral sent to [facility] to try to assist with mmg and pap smear. Pt lives in [place], [place]

## 2025-07-21 NOTE — LETTER
AUTHORIZATION FOR RELEASE OF   CONFIDENTIAL INFORMATION        We are seeing Shawna Nguyen, date of birth 1960, in the clinic at [facility]. Papa Nunez MD is the patient's PCP. Shawna Nguyen has an outstanding lab/procedure at the time we reviewed her chart. In order to help keep her health information updated, she has authorized us to request the following medical record(s):           (  )  HPV 22                                             Please fax records to [facility], Manuel, Chad B., MD,  at [phone] or email to [email or web address].             Patient Name: Shawna Nguyen  : 1960  Patient Phone #: [phone]

## 2025-07-21 NOTE — LETTER
25    AUTHORIZATION FOR RELEASE OF   CONFIDENTIAL INFORMATION    Dr Montanez,    We are seeing Shawna Nguyen, date of birth 1960, in the clinic at [facility]. Papa Nunez MD is the patient's PCP. Shawna Nguyen has an outstanding lab/procedure at the time we reviewed her chart. In order to help keep her health information updated, she has authorized us to request the following medical record(s):       MOST RECENT PAP SMEAR       Please fax records to [facility], Manuel, Chad B., MD,  at [phone] or email to [email or web address].             Patient Name: Shawna Nguyen  : 1960  Patient Phone #: [phone]

## 2025-07-23 NOTE — PROGRESS NOTES
External record received -[facility] Pap smear into , report states HPV was order but there no results will efax for it

## 2025-08-15 ENCOUNTER — PATIENT MESSAGE (OUTPATIENT)
Facility: CLINIC | Age: 65
End: 2025-08-15